# Patient Record
Sex: MALE | Race: BLACK OR AFRICAN AMERICAN | Employment: OTHER | URBAN - METROPOLITAN AREA
[De-identification: names, ages, dates, MRNs, and addresses within clinical notes are randomized per-mention and may not be internally consistent; named-entity substitution may affect disease eponyms.]

---

## 2017-03-03 ENCOUNTER — OFFICE VISIT (OUTPATIENT)
Dept: FAMILY MEDICINE | Facility: CLINIC | Age: 72
End: 2017-03-03
Payer: COMMERCIAL

## 2017-03-03 VITALS
WEIGHT: 240 LBS | BODY MASS INDEX: 36.37 KG/M2 | SYSTOLIC BLOOD PRESSURE: 162 MMHG | DIASTOLIC BLOOD PRESSURE: 112 MMHG | OXYGEN SATURATION: 98 % | RESPIRATION RATE: 14 BRPM | HEIGHT: 68 IN | TEMPERATURE: 98.1 F | HEART RATE: 110 BPM

## 2017-03-03 DIAGNOSIS — I10 BENIGN ESSENTIAL HYPERTENSION: Primary | ICD-10-CM

## 2017-03-03 LAB
BUN SERPL-MCNC: 16 MG/DL (ref 7–30)
CREAT SERPL-MCNC: 1.39 MG/DL (ref 0.66–1.25)
GFR SERPL CREATININE-BSD FRML MDRD: 51 ML/MIN/1.7M2
POTASSIUM SERPL-SCNC: 4.4 MMOL/L (ref 3.4–5.3)

## 2017-03-03 PROCEDURE — 99203 OFFICE O/P NEW LOW 30 MIN: CPT | Performed by: PHYSICIAN ASSISTANT

## 2017-03-03 PROCEDURE — 84132 ASSAY OF SERUM POTASSIUM: CPT | Performed by: PHYSICIAN ASSISTANT

## 2017-03-03 PROCEDURE — 36415 COLL VENOUS BLD VENIPUNCTURE: CPT | Performed by: PHYSICIAN ASSISTANT

## 2017-03-03 PROCEDURE — 82565 ASSAY OF CREATININE: CPT | Performed by: PHYSICIAN ASSISTANT

## 2017-03-03 PROCEDURE — 84520 ASSAY OF UREA NITROGEN: CPT | Performed by: PHYSICIAN ASSISTANT

## 2017-03-03 RX ORDER — LISINOPRIL 10 MG/1
10 TABLET ORAL DAILY
Qty: 30 TABLET | Refills: 1 | Status: SHIPPED | OUTPATIENT
Start: 2017-03-03 | End: 2017-03-31 | Stop reason: DRUGHIGH

## 2017-03-03 RX ORDER — LISINOPRIL 10 MG/1
10 TABLET ORAL DAILY
COMMUNITY
End: 2017-03-03

## 2017-03-03 NOTE — PROGRESS NOTES
SUBJECTIVE:                                                    Bharat Bain is a 72 year old male who presents to clinic today for the following health issues      Hypertension Follow-up      Outpatient blood pressures are being checked at home.      Low Salt Diet: no added salt       Amount of exercise or physical activity: 2-3 days/week for an average of 15-30 minutes---Walks    Problems taking medications regularly: No---off of med X5 days    Medication side effects: none  Diet:Unknown    Pt is from [facility] and visits today with his daughter and son in law. He is a permanent resident here and visits for several months at a time then returns to [facility] for a few months.     He was prescribed lisinopril 10mg daily last year for high blood pressure. He has been taking it regularly until he ran out 5 days ago. He would like to restart the medication. He has not had symptoms of shortness of breath, chest pain, headache, lightheadedness, dizziness, numbness or tingling of the face or extremities.     He has had a complete physical in [facility] within the last year and does not think he is due for that.     Problem list and histories reviewed & adjusted, as indicated.  Additional history: as documented    Patient Active Problem List   Diagnosis     Benign essential hypertension     History reviewed. No pertinent past surgical history.    Social History   Substance Use Topics     Smoking status: Never Smoker     Smokeless tobacco: Not on file     Alcohol use No     History reviewed. No pertinent family history.      Current Outpatient Prescriptions   Medication Sig Dispense Refill     aspirin 81 MG tablet Take 81 mg by mouth daily       lisinopril (PRINIVIL/ZESTRIL) 10 MG tablet Take 1 tablet (10 mg) by mouth daily 30 tablet 1     [DISCONTINUED] lisinopril (ZESTRIL) 10 MG tablet Take 10 mg by mouth daily         Reviewed and updated as needed this visit by clinical staff  Tobacco  Meds  Med Hx  Surg Hx  Fam Hx  Soc Hx  "     Reviewed and updated as needed this visit by Provider         ROS:  Constitutional, HEENT, cardiovascular, pulmonary, gi and gu systems are negative, except as otherwise noted.    OBJECTIVE:                                                    BP (!) 162/112  Pulse 110  Temp 98.1  F (36.7  C) (Tympanic)  Resp 14  Ht 5' 8\" (1.727 m)  Wt 240 lb (108.9 kg)  SpO2 98%  BMI 36.49 kg/m2  Body mass index is 36.49 kg/(m^2).  GENERAL: healthy, alert and no distress  NECK: no adenopathy, no asymmetry, masses, or scars and thyroid normal to palpation  RESP: lungs clear to auscultation - no rales, rhonchi or wheezes  CV: regular rate and rhythm, normal S1 S2, no S3 or S4, no murmur, click or rub, no peripheral edema and peripheral pulses strong  SKIN: no suspicious lesions or rashes  NEURO: Normal strength and tone, mentation intact and speech normal  PSYCH: mentation appears normal, affect normal/bright    Diagnostic Test Results:  Pending below     ASSESSMENT/PLAN:                                                        ICD-10-CM    1. Benign essential hypertension I10 lisinopril (PRINIVIL/ZESTRIL) 10 MG tablet     Creatinine     Urea nitrogen     Potassium     Return in 2 weeks for nurse only visit for blood pressure recheck.   If within acceptable limits, will continue current medication dose (refill x 1 yr)   If not within acceptable limits, will increase to 20mg daily and recheck in another 2 weeks.     The patient and his family agree with this plan.     Nicole Joy Siegler, PA-C  [facility]  "

## 2017-03-03 NOTE — LETTER
[facility]  [address]  [address]  [place] [address]  [phone]                                                                                                           Bharat Bain  [address]   [place] [address]    March 3, 2017      Dear Bharat,    Your recent lab results are below. This shows your kidney function is slightly less than normal. We should continue to monitor this. I recommend recheck in 1-2 months and continuing with your medication as discussed during our visit.     Be sure that you are drinking enough water; there are times that these results are due to dehydration and not problems with your kidney.     Results for orders placed or performed in visit on 03/03/17   Creatinine   Result Value Ref Range    Creatinine 1.39 (H) 0.66 - 1.25 mg/dL    GFR Estimate 51 (L) >60 mL/min/1.7m2    GFR Estimate If Black 61 >60 mL/min/1.7m2   Urea nitrogen   Result Value Ref Range    Urea Nitrogen 16 7 - 30 mg/dL   Potassium   Result Value Ref Range    Potassium 4.4 3.4 - 5.3 mmol/L     Thank you for choosing [facility].  We appreciate the opportunity to serve you and look forward to supporting your healthcare needs in the future.    If you have any questions or concerns, please call me or my staff at [phone].      Sincerely,        Nicole Joy Siegler, PA-C

## 2017-03-03 NOTE — MR AVS SNAPSHOT
After Visit Summary   3/3/2017    Bharat Bain    MRN: [id number]           Patient Information     Date Of Birth          1945        Visit Information        Provider Department      3/3/2017 9:30 AM Siegler, Nicole Joy, PA-C [facility]        Today's Diagnoses     Benign essential hypertension    -  1      Care Instructions      Preventive Health Recommendations:       Male Ages 65 and over    Yearly exam:             See your health care provider every year in order to  o   Review health changes.   o   Discuss preventive care.    o   Review your medicines if your doctor has prescribed any.    Talk with your health care provider about whether you should have a test to screen for prostate cancer (PSA).    Every 3 years, have a diabetes test (fasting glucose). If you are at risk for diabetes, you should have this test more often.    Every 5 years, have a cholesterol test. Have this test more often if you are at risk for high cholesterol or heart disease.     Every 10 years, have a colonoscopy. Or, have a yearly FIT test (stool test). These exams will check for colon cancer.    Talk to with your health care provider about screening for Abdominal Aortic Aneurysm if you have a family history of AAA or have a history of smoking.  Shots:     Get a flu shot each year.     Get a tetanus shot every 10 years.     Talk to your doctor about your pneumonia vaccines. There are now two you should receive - Pneumovax (PPSV 23) and Prevnar (PCV 13).    Talk to your doctor about a shingles vaccine.     Talk to your doctor about the hepatitis B vaccine.  Nutrition:     Eat at least 5 servings of fruits and vegetables each day.     Eat whole-grain bread, whole-wheat pasta and brown rice instead of white grains and rice.     Talk to your doctor about Calcium and Vitamin D.   Lifestyle    Exercise for at least 150 minutes a week (30 minutes a day, 5 days a week). This will help you  "control your weight and prevent disease.     Limit alcohol to one drink per day.     No smoking.     Wear sunscreen to prevent skin cancer.     See your dentist every six months for an exam and cleaning.     See your eye doctor every 1 to 2 years to screen for conditions such as glaucoma, macular degeneration and cataracts.        Follow-ups after your visit        Who to contact     If you have questions or need follow up information about today's clinic visit or your schedule please contact [facility] directly at [phone].  Normal or non-critical lab and imaging results will be communicated to you by Moncaihart, letter or phone within 4 business days after the clinic has received the results. If you do not hear from us within 7 days, please contact the clinic through [place] or phone. If you have a critical or abnormal lab result, we will notify you by phone as soon as possible.  Submit refill requests through STAT-Diagnostica or call your pharmacy and they will forward the refill request to us. Please allow 3 business days for your refill to be completed.          Additional Information About Your Visit        STAT-Diagnostica Information     [employer]-Diagnostica lets you send messages to your doctor, view your test results, renew your prescriptions, schedule appointments and more. To sign up, go to [email or web address] . Click on \"Log in\" on the left side of the screen, which will take you to the Welcome page. Then click on \"Sign up Now\" on the right side of the page.     You will be asked to enter the access code listed below, as well as some personal information. Please follow the directions to create your username and password.     Your access code is: [email or web address]  Expires: 2017  9:56 AM     Your access code will  in 90 days. If you need help or a new code, please call your [facility] or [phone].        Care EveryWhere ID     This is your Care EveryWhere ID. This could be used by other " "organizations to access your [facility] medical records  [id number]        Your Vitals Were     Pulse Temperature Respirations Height Pulse Oximetry BMI (Body Mass Index)    110 98.1  F (36.7  C) (Tympanic) 14 5' 8\" (1.727 m) 98% 36.49 kg/m2       Blood Pressure from Last 3 Encounters:   03/03/17 (!) 162/112    Weight from Last 3 Encounters:   03/03/17 240 lb (108.9 kg)              We Performed the Following     Creatinine     Potassium     Urea nitrogen          Today's Medication Changes          These changes are accurate as of: 3/3/17  9:56 AM.  If you have any questions, ask your nurse or doctor.               These medicines have changed or have updated prescriptions.        Dose/Directions    * ZESTRIL 10 MG tablet   This may have changed:  Another medication with the same name was added. Make sure you understand how and when to take each.   Generic drug:  lisinopril   Changed by:  Siegler, Nicole Joy, PA-C        Dose:  10 mg   Take 10 mg by mouth daily   Refills:  0       * lisinopril 10 MG tablet   Commonly known as:  PRINIVIL/ZESTRIL   This may have changed:  You were already taking a medication with the same name, and this prescription was added. Make sure you understand how and when to take each.   Used for:  Benign essential hypertension   Changed by:  Siegler, Nicole Joy, PA-C        Dose:  10 mg   Take 1 tablet (10 mg) by mouth daily   Quantity:  30 tablet   Refills:  1       * Notice:  This list has 2 medication(s) that are the same as other medications prescribed for you. Read the directions carefully, and ask your doctor or other care provider to review them with you.         Where to get your medicines      These medications were sent to [facility] [place], [place] - [address], [address]  [address], [address], [place] [address]     Phone:  [phone]     lisinopril 10 MG tablet                Primary Care Provider    None Specified       No primary provider on " file.        Thank you!     Thank you for choosing [facility]  for your care. Our goal is always to provide you with excellent care. Hearing back from our patients is one way we can continue to improve our services. Please take a few minutes to complete the written survey that you may receive in the mail after your visit with us. Thank you!             Your Updated Medication List - Protect others around you: Learn how to safely use, store and throw away your medicines at www.disposemymeds.org.          This list is accurate as of: 3/3/17  9:56 AM.  Always use your most recent med list.                   Brand Name Dispense Instructions for use    aspirin 81 MG tablet      Take 81 mg by mouth daily       * ZESTRIL 10 MG tablet   Generic drug:  lisinopril      Take 10 mg by mouth daily       * lisinopril 10 MG tablet    PRINIVIL/ZESTRIL    30 tablet    Take 1 tablet (10 mg) by mouth daily       * Notice:  This list has 2 medication(s) that are the same as other medications prescribed for you. Read the directions carefully, and ask your doctor or other care provider to review them with you.

## 2017-03-17 ENCOUNTER — ALLIED HEALTH/NURSE VISIT (OUTPATIENT)
Dept: NURSING | Facility: CLINIC | Age: 72
End: 2017-03-17
Payer: MEDICAID

## 2017-03-17 ENCOUNTER — TELEPHONE (OUTPATIENT)
Dept: FAMILY MEDICINE | Facility: CLINIC | Age: 72
End: 2017-03-17

## 2017-03-17 VITALS — DIASTOLIC BLOOD PRESSURE: 100 MMHG | SYSTOLIC BLOOD PRESSURE: 150 MMHG

## 2017-03-17 DIAGNOSIS — Z01.30 BP CHECK: Primary | ICD-10-CM

## 2017-03-17 DIAGNOSIS — I10 BENIGN ESSENTIAL HYPERTENSION: Primary | ICD-10-CM

## 2017-03-17 PROCEDURE — 99207 ZZC NO CHARGE NURSE ONLY: CPT

## 2017-03-17 RX ORDER — LISINOPRIL 20 MG/1
20 TABLET ORAL DAILY
Qty: 30 TABLET | Refills: 1 | Status: SHIPPED | OUTPATIENT
Start: 2017-03-17 | End: 2017-03-31 | Stop reason: DRUGHIGH

## 2017-03-17 NOTE — NURSING NOTE
"Chief Complaint   Patient presents with     Hypertension     There were no vitals taken for this visit. Estimated body mass index is 36.49 kg/(m^2) as calculated from the following:    Height as of 3/3/17: 5' 8\" (1.727 m).    Weight as of 3/3/17: 240 lb (108.9 kg).  BP completed using cuff size: adriana Terrazas CMA    Health Maintenance Due   Topic Date Due     TETANUS IMMUNIZATION (SYSTEM ASSIGNED)  02/28/1965     ADVANCE DIRECTIVE PLANNING Q5 YRS (NO INBASKET)  02/28/1965     HEPATITIS C SCREENING  02/28/1965     LIPID SCREEN Q5 YR MALE (SYSTEM ASSIGNED)  02/28/1982     COLON CANCER SCREEN (SYSTEM ASSIGNED)  02/28/1997     PNEUMOCOCCAL (1 of 2 - PCV13) 02/28/2012     AORTIC ANEURYSM SCREENING (SYSTEM ASSIGNED)  02/28/2012     Health Maintenance reviewed at today's visit patient asked to schedule/complete:   None, Health Maintenance up to date.    "

## 2017-03-17 NOTE — TELEPHONE ENCOUNTER
lisinopril (PRINIVIL/ZESTRIL) 10 MG tablet (per office note, increased 20 mg (2 x 10mg tablets)) So patient is requesting a new prescription for 20 mg))   Last Written Prescription Date: 3/3/17  Last Fill Quantity: 30, # refills: 0   [place] Office [place] [facility] or [facility] prescribing provider: 3/3/17       Potassium   Date Value Ref Range Status   03/03/2017 4.4 3.4 - 5.3 mmol/L Final     Creatinine   Date Value Ref Range Status   03/03/2017 1.39 (H) 0.66 - 1.25 mg/dL Final     BP Readings from Last 3 Encounters:   03/17/17 (!) 150/100   03/03/17 (!) 162/112     Routing refill request to provider for review/approval because:  Drug not active on patient's medication list (dosage increase)

## 2017-03-17 NOTE — MR AVS SNAPSHOT
"              After Visit Summary   3/17/2017    Bharat Bain    MRN: [id number]           Patient Information     Date Of Birth          1945        Visit Information        Provider Department      3/17/2017 9:00 AM BX NURSE [facility]        Today's Diagnoses     BP check    -  1       Follow-ups after your visit        Who to contact     If you have questions or need follow up information about today's clinic visit or your schedule please contact [facility] directly at [phone].  Normal or non-critical lab and imaging results will be communicated to you by ModoPaymentshart, letter or phone within 4 business days after the clinic has received the results. If you do not hear from us within 7 days, please contact the clinic through IM-Senset or phone. If you have a critical or abnormal lab result, we will notify you by phone as soon as possible.  Submit refill requests through Yesware or call your pharmacy and they will forward the refill request to us. Please allow 3 business days for your refill to be completed.          Additional Information About Your Visit        MyChart Information     Yesware lets you send messages to your doctor, view your test results, renew your prescriptions, schedule appointments and more. To sign up, go to [email or web address] . Click on \"Log in\" on the left side of the screen, which will take you to the Welcome page. Then click on \"Sign up Now\" on the right side of the page.     You will be asked to enter the access code listed below, as well as some personal information. Please follow the directions to create your username and password.     Your access code is: [email or web address]  Expires: 2017 10:56 AM     Your access code will  in 90 days. If you need help or a new code, please call your [facility] or [phone].        Care EveryWhere ID     This is your Care EveryWhere ID. This could be used by other " organizations to access your [place] medical records  [id number]         Blood Pressure from Last 3 Encounters:   03/17/17 (!) 150/100   03/03/17 (!) 162/112    Weight from Last 3 Encounters:   03/03/17 240 lb (108.9 kg)              Today, you had the following     No orders found for display       Primary Care Provider    None Specified       No primary provider on file.        Thank you!     Thank you for choosing [facility]  for your care. Our goal is always to provide you with excellent care. Hearing back from our patients is one way we can continue to improve our services. Please take a few minutes to complete the written survey that you may receive in the mail after your visit with us. Thank you!             Your Updated Medication List - Protect others around you: Learn how to safely use, store and throw away your medicines at www.disposemymeds.org.          This list is accurate as of: 3/17/17  9:59 AM.  Always use your most recent med list.                   Brand Name Dispense Instructions for use    aspirin 81 MG tablet      Take 81 mg by mouth daily       lisinopril 10 MG tablet    PRINIVIL/ZESTRIL    30 tablet    Take 1 tablet (10 mg) by mouth daily

## 2017-03-31 ENCOUNTER — OFFICE VISIT (OUTPATIENT)
Dept: FAMILY MEDICINE | Facility: CLINIC | Age: 72
End: 2017-03-31
Payer: COMMERCIAL

## 2017-03-31 VITALS
BODY MASS INDEX: 36.07 KG/M2 | OXYGEN SATURATION: 96 % | HEIGHT: 68 IN | WEIGHT: 238 LBS | RESPIRATION RATE: 14 BRPM | SYSTOLIC BLOOD PRESSURE: 152 MMHG | HEART RATE: 99 BPM | TEMPERATURE: 98.3 F | DIASTOLIC BLOOD PRESSURE: 100 MMHG

## 2017-03-31 DIAGNOSIS — I10 BENIGN ESSENTIAL HYPERTENSION: Primary | ICD-10-CM

## 2017-03-31 PROCEDURE — 99213 OFFICE O/P EST LOW 20 MIN: CPT | Performed by: PHYSICIAN ASSISTANT

## 2017-03-31 RX ORDER — LISINOPRIL 40 MG/1
40 TABLET ORAL DAILY
Qty: 30 TABLET | Refills: 0 | Status: SHIPPED | OUTPATIENT
Start: 2017-03-31 | End: 2017-04-14

## 2017-03-31 NOTE — PATIENT INSTRUCTIONS
Start taking lisinopril 40mg  Continue exercise and drinking water as we discussed  Continue your healthy diet  RETURN IN 2 WEEKS FOR BLOOD PRESSURE CHECK AND HAVE YOUR LABS DRAWN

## 2017-03-31 NOTE — NURSING NOTE
"Chief Complaint   Patient presents with     Hypertension     Recheck       Initial BP (!) 144/98 (BP Location: Left arm, Patient Position: Chair, Cuff Size: Adult Large)  Pulse 99  Temp 98.3  F (36.8  C) (Tympanic)  Resp 14  Ht 5' 8\" (1.727 m)  Wt 238 lb (108 kg)  SpO2 96%  BMI 36.19 kg/m2 Estimated body mass index is 36.19 kg/(m^2) as calculated from the following:    Height as of this encounter: 5' 8\" (1.727 m).    Weight as of this encounter: 238 lb (108 kg).  Medication Reconciliation: complete       Geri Roman LPN  "

## 2017-03-31 NOTE — MR AVS SNAPSHOT
"              After Visit Summary   3/31/2017    Bharat Bain    MRN: [id number]           Patient Information     Date Of Birth          1945        Visit Information        Provider Department      3/31/2017 9:30 AM Siegler, Nicole Joy, PA-C [facility]        Today's Diagnoses     Benign essential hypertension    -  1      Care Instructions    Start taking lisinopril 40mg  Continue exercise and drinking water as we discussed  Continue your healthy diet  RETURN IN 2 WEEKS FOR BLOOD PRESSURE CHECK AND HAVE YOUR LABS DRAWN        Follow-ups after your visit        Future tests that were ordered for you today     Open Future Orders        Priority Expected Expires Ordered    Creatinine Routine 4/1/2017 3/31/2018 3/31/2017            Who to contact     If you have questions or need follow up information about today's clinic visit or your schedule please contact [facility] directly at [phone].  Normal or non-critical lab and imaging results will be communicated to you by MyChart, letter or phone within 4 business days after the clinic has received the results. If you do not hear from us within 7 days, please contact the clinic through UrbanBoundhart or phone. If you have a critical or abnormal lab result, we will notify you by phone as soon as possible.  Submit refill requests through OuiCar or call your pharmacy and they will forward the refill request to us. Please allow 3 business days for your refill to be completed.          Additional Information About Your Visit        MyChart Information     OuiCar lets you send messages to your doctor, view your test results, renew your prescriptions, schedule appointments and more. To sign up, go to [email or web address] . Click on \"Log in\" on the left side of the screen, which will take you to the Welcome page. Then click on \"Sign up Now\" on the right side of the page.     You will be asked to enter the " "access code listed below, as well as some personal information. Please follow the directions to create your username and password.     Your access code is: [email or web address]  Expires: 2017 10:56 AM     Your access code will  in 90 days. If you need help or a new code, please call your [facility] clinic or [phone].        Care EveryWhere ID     This is your Care EveryWhere ID. This could be used by other organizations to access your [facility] medical records  [id number]        Your Vitals Were     Pulse Temperature Respirations Height Pulse Oximetry BMI (Body Mass Index)    99 98.3  F (36.8  C) (Tympanic) 14 5' 8\" (1.727 m) 96% 36.19 kg/m2       Blood Pressure from Last 3 Encounters:   17 (!) 152/100   17 (!) 150/100   17 (!) 162/112    Weight from Last 3 Encounters:   17 238 lb (108 kg)   17 240 lb (108.9 kg)                 Today's Medication Changes          These changes are accurate as of: 3/31/17  9:56 AM.  If you have any questions, ask your nurse or doctor.               These medicines have changed or have updated prescriptions.        Dose/Directions    lisinopril 40 MG tablet   Commonly known as:  PRINIVIL/ZESTRIL   This may have changed:    - medication strength  - how much to take  - Another medication with the same name was removed. Continue taking this medication, and follow the directions you see here.   Used for:  Benign essential hypertension   Changed by:  Siegler, Nicole Joy, PA-C        Dose:  40 mg   Take 1 tablet (40 mg) by mouth daily   Quantity:  30 tablet   Refills:  0            Where to get your medicines      These medications were sent to [facility], [place] - [address], [address]  [address], [facility] [facility] [address], [place] [facility] [address]     Phone:  [phone]     lisinopril 40 MG tablet                Primary Care Provider    None Specified       No primary provider on file.        Thank you!     Thank you for choosing " [facility]  for your care. Our goal is always to provide you with excellent care. Hearing back from our patients is one way we can continue to improve our services. Please take a few minutes to complete the written survey that you may receive in the mail after your visit with us. Thank you!             Your Updated Medication List - Protect others around you: Learn how to safely use, store and throw away your medicines at www.disposemymeds.org.          This list is accurate as of: 3/31/17  9:56 AM.  Always use your most recent med list.                   Brand Name Dispense Instructions for use    aspirin 81 MG tablet      Take 81 mg by mouth daily       lisinopril 40 MG tablet    PRINIVIL/ZESTRIL    30 tablet    Take 1 tablet (40 mg) by mouth daily

## 2017-03-31 NOTE — PROGRESS NOTES
SUBJECTIVE:                                                    Bharat Bain is a 72 year old male who presents to clinic today for the following health issues:    Hypertension Follow-up      Outpatient blood pressures are being checked at home.  Results are elevated.    Low Salt Diet: no added salt       Amount of exercise or physical activity: 2-3 days/week for an average of 15-30 minutes    Problems taking medications regularly: No    Medication side effects: none    Diet: low salt    Pt has been doing well with his diet, he has been walking for exercise though not as much as he feels he can. He is taking his medications as directed.  He is getting a little nervous about his blood pressure continuing to be high. He has been otherwise well- see ROS    Problem list and histories reviewed & adjusted, as indicated.  Additional history: as documented    Patient Active Problem List   Diagnosis     Benign essential hypertension     History reviewed. No pertinent surgical history.    Social History   Substance Use Topics     Smoking status: Never Smoker     Smokeless tobacco: Not on file     Alcohol use No     Family History   Problem Relation Age of Onset     Unknown/Adopted Mother      Unknown/Adopted Father      DIABETES No family hx of      Coronary Artery Disease No family hx of      Hypertension No family hx of      Hyperlipidemia No family hx of      CEREBROVASCULAR DISEASE No family hx of      Breast Cancer No family hx of      Colon Cancer No family hx of      Prostate Cancer No family hx of      Other Cancer No family hx of      Depression No family hx of      Anxiety Disorder No family hx of      MENTAL ILLNESS No family hx of      Substance Abuse No family hx of      Anesthesia Reaction No family hx of      Asthma No family hx of      OSTEOPOROSIS No family hx of      Genetic Disorder No family hx of      Thyroid Disease No family hx of      Obesity No family hx of          Current Outpatient Prescriptions  "  Medication Sig Dispense Refill     lisinopril (PRINIVIL/ZESTRIL) 40 MG tablet Take 1 tablet (40 mg) by mouth daily 30 tablet 0     [DISCONTINUED] lisinopril (PRINIVIL/ZESTRIL) 20 MG tablet Take 1 tablet (20 mg) by mouth daily 30 tablet 1     aspirin 81 MG tablet Take 81 mg by mouth daily       [DISCONTINUED] lisinopril (PRINIVIL/ZESTRIL) 10 MG tablet Take 1 tablet (10 mg) by mouth daily 30 tablet 1       Reviewed and updated as needed this visit by clinical staff  Tobacco  Meds  Med Hx  Surg Hx  Fam Hx  Soc Hx      Reviewed and updated as needed this visit by Provider         ROS:  Constitutional, HEENT, cardiovascular, pulmonary, gi and gu systems are negative, except as otherwise noted.    OBJECTIVE:                                                    BP (!) 152/100  Pulse 99  Temp 98.3  F (36.8  C) (Tympanic)  Resp 14  Ht 5' 8\" (1.727 m)  Wt 238 lb (108 kg)  SpO2 96%  BMI 36.19 kg/m2  Body mass index is 36.19 kg/(m^2).  GENERAL: healthy, alert and no distress  NECK: no adenopathy, no asymmetry, masses, or scars and thyroid normal to palpation  RESP: lungs clear to auscultation - no rales, rhonchi or wheezes  CV: regular rate and rhythm, normal S1 S2, no S3 or S4, no murmur, click or rub, no peripheral edema and peripheral pulses strong    Diagnostic Test Results:  none      ASSESSMENT/PLAN:                                                        ICD-10-CM    1. Benign essential hypertension I10 lisinopril (PRINIVIL/ZESTRIL) 40 MG tablet     Creatinine     Discussed instructions below; he agrees with that plan.     Patient Instructions   Start taking lisinopril 40mg  Continue exercise and drinking water as we discussed  Continue your healthy diet  RETURN IN 2 WEEKS FOR BLOOD PRESSURE CHECK AND HAVE YOUR LABS DRAWN      Nicole Joy Siegler, PA-C  [facility]  "

## 2017-04-14 ENCOUNTER — TELEPHONE (OUTPATIENT)
Dept: FAMILY MEDICINE | Facility: CLINIC | Age: 72
End: 2017-04-14

## 2017-04-14 ENCOUNTER — ALLIED HEALTH/NURSE VISIT (OUTPATIENT)
Dept: NURSING | Facility: CLINIC | Age: 72
End: 2017-04-14
Payer: COMMERCIAL

## 2017-04-14 VITALS
DIASTOLIC BLOOD PRESSURE: 98 MMHG | WEIGHT: 231 LBS | SYSTOLIC BLOOD PRESSURE: 142 MMHG | BODY MASS INDEX: 35.12 KG/M2 | HEART RATE: 100 BPM

## 2017-04-14 DIAGNOSIS — I10 BENIGN ESSENTIAL HYPERTENSION: ICD-10-CM

## 2017-04-14 DIAGNOSIS — I10 BENIGN ESSENTIAL HYPERTENSION: Primary | ICD-10-CM

## 2017-04-14 LAB
CREAT SERPL-MCNC: 1.62 MG/DL (ref 0.66–1.25)
GFR SERPL CREATININE-BSD FRML MDRD: 42 ML/MIN/1.7M2

## 2017-04-14 PROCEDURE — 36415 COLL VENOUS BLD VENIPUNCTURE: CPT | Performed by: PHYSICIAN ASSISTANT

## 2017-04-14 PROCEDURE — 99207 ZZC NO CHARGE LOS: CPT

## 2017-04-14 PROCEDURE — 82565 ASSAY OF CREATININE: CPT | Performed by: PHYSICIAN ASSISTANT

## 2017-04-14 RX ORDER — LISINOPRIL 40 MG/1
20 TABLET ORAL DAILY
Qty: 30 TABLET | Refills: 0
Start: 2017-04-14 | End: 2017-05-12

## 2017-04-14 RX ORDER — AMLODIPINE BESYLATE 5 MG/1
5 TABLET ORAL DAILY
Qty: 30 TABLET | Refills: 1 | Status: SHIPPED | OUTPATIENT
Start: 2017-04-14 | End: 2017-05-12 | Stop reason: DRUGHIGH

## 2017-04-14 NOTE — TELEPHONE ENCOUNTER
I spoke with the pt's son in law and explained to him and the patient (on speaker phone) the plan is this:   1) reduce lisinopril to 20mg (1/2 tab)   2) start amlodipine 5mg daily  3) recheck blood pressure with nursing visit in 2 weeks  4) we will recheck kidney function and blood pressure again at 4 weeks and adjust from there.   They were in agreement with this plan.   Nicole Joy Siegler, PA-C

## 2017-04-14 NOTE — TELEPHONE ENCOUNTER
His blood pressure is improving well. He should have one more check of blood pressure in 2 weeks. It does take up to 4 weeks for the full effect of the daily dose of medication to be seen. I anticipate the blood pressure will be within normal limits by that time. Please ask him to return in 2 weeks for blood pressure (nurse only visit). He is doing very well! Thank you- Nicole Joy Siegler, PA-C

## 2017-05-12 ENCOUNTER — ALLIED HEALTH/NURSE VISIT (OUTPATIENT)
Dept: NURSING | Facility: CLINIC | Age: 72
End: 2017-05-12
Payer: COMMERCIAL

## 2017-05-12 VITALS — SYSTOLIC BLOOD PRESSURE: 152 MMHG | OXYGEN SATURATION: 99 % | DIASTOLIC BLOOD PRESSURE: 98 MMHG | HEART RATE: 106 BPM

## 2017-05-12 DIAGNOSIS — I10 BENIGN ESSENTIAL HYPERTENSION: Primary | ICD-10-CM

## 2017-05-12 PROCEDURE — 99207 ZZC NO CHARGE LOS: CPT

## 2017-05-12 RX ORDER — LISINOPRIL 40 MG/1
20 TABLET ORAL DAILY
Qty: 30 TABLET | Refills: 1 | Status: SHIPPED | OUTPATIENT
Start: 2017-05-12 | End: 2017-08-22

## 2017-05-12 RX ORDER — AMLODIPINE BESYLATE 10 MG/1
10 TABLET ORAL DAILY
Qty: 30 TABLET | Refills: 1 | Status: SHIPPED | OUTPATIENT
Start: 2017-05-12 | End: 2017-06-26

## 2017-05-12 NOTE — NURSING NOTE
"Chief Complaint   Patient presents with     Hypertension     Nurse only visit to recheck       Initial BP (!) 152/98  Pulse 106  SpO2 99% Estimated body mass index is 35.12 kg/(m^2) as calculated from the following:    Height as of 3/31/17: 5' 8\" (1.727 m).    Weight as of 4/14/17: 231 lb (104.8 kg).  Medication Reconciliation: complete     Geri Roman LPN    B/P was checked X2 using adult regular and adult large cuff. B/P results elevated X2. Primary provider was notifed. Amlodipine was increased to 10mg po QD. Patient and family were advised of change in medication. They were directed to schedule a follow up appointment with provider in two weeks.  "

## 2017-05-12 NOTE — PROGRESS NOTES
Discussed blood pressure results with MA and she will notify patient of increase in amlodipine to 10mg daily. Recheck in 2 weeks. Nicole Joy Siegler, PA-C

## 2017-05-12 NOTE — MR AVS SNAPSHOT
"              After Visit Summary   2017    Bharat Bain    MRN: [id number]           Patient Information     Date Of Birth          1945        Visit Information        Provider Department      2017 10:30 AM BX NURSE [facility]        Today's Diagnoses     Benign essential hypertension    -  1       Follow-ups after your visit        Who to contact     If you have questions or need follow up information about today's clinic visit or your schedule please contact [facility] directly at [phone].  Normal or non-critical lab and imaging results will be communicated to you by RadMithart, letter or phone within 4 business days after the clinic has received the results. If you do not hear from us within 7 days, please contact the clinic through [employer] or phone. If you have a critical or abnormal lab result, we will notify you by phone as soon as possible.  Submit refill requests through [employer] or call your pharmacy and they will forward the refill request to us. Please allow 3 business days for your refill to be completed.          Additional Information About Your Visit        MyChart Information     Action[employer]anner lets you send messages to your doctor, view your test results, renew your prescriptions, schedule appointments and more. To sign up, go to [email or web address] . Click on \"Log in\" on the left side of the screen, which will take you to the Welcome page. Then click on \"Sign up Now\" on the right side of the page.     You will be asked to enter the access code listed below, as well as some personal information. Please follow the directions to create your username and password.     Your access code is: [email or web address]  Expires: 2017 10:56 AM     Your access code will  in 90 days. If you need help or a new code, please call your [facility] or [phone].        Care EveryWhere ID     This is your Care EveryWhere ID. This could " be used by other organizations to access your [facility] medical records  [id number]        Your Vitals Were     Pulse Pulse Oximetry                106 99%           Blood Pressure from Last 3 Encounters:   05/12/17 (!) 152/98   04/14/17 (!) 142/98   03/31/17 (!) 152/100    Weight from Last 3 Encounters:   04/14/17 231 lb (104.8 kg)   03/31/17 238 lb (108 kg)   03/03/17 240 lb (108.9 kg)              Today, you had the following     No orders found for display         Today's Medication Changes          These changes are accurate as of: 5/12/17 10:59 AM.  If you have any questions, ask your nurse or doctor.               These medicines have changed or have updated prescriptions.        Dose/Directions    amLODIPine 10 MG tablet   Commonly known as:  NORVASC   This may have changed:    - medication strength  - how much to take   Used for:  Benign essential hypertension        Dose:  10 mg   Take 1 tablet (10 mg) by mouth daily   Quantity:  30 tablet   Refills:  1            Where to get your medicines      These medications were sent to [facility] [place] - [address]  [address], [facility] [address]     Phone:  [phone]     amLODIPine 10 MG tablet    lisinopril 40 MG tablet                Primary Care Provider Office Phone # Fax #    Nicole Joy Siegler, PA-C [phone]       [facility] [address]  [facility] [address]        Thank you!     Thank you for choosing [facility]  for your care. Our goal is always to provide you with excellent care. Hearing back from our patients is one way we can continue to improve our services. Please take a few minutes to complete the written survey that you may receive in the mail after your visit with us. Thank you!             Your Updated Medication List - Protect others around you: Learn how to safely use, store and throw away your medicines at  www.disposemymeds.org.          This list is accurate as of: 5/12/17 10:59 AM.  Always use your most recent med list.                   Brand Name Dispense Instructions for use    amLODIPine 10 MG tablet    NORVASC    30 tablet    Take 1 tablet (10 mg) by mouth daily       aspirin 81 MG tablet      Take 81 mg by mouth daily       lisinopril 40 MG tablet    PRINIVIL/ZESTRIL    30 tablet    Take 0.5 tablets (20 mg) by mouth daily (reduced due to raising creatinine)

## 2017-05-25 ENCOUNTER — OFFICE VISIT (OUTPATIENT)
Dept: FAMILY MEDICINE | Facility: CLINIC | Age: 72
End: 2017-05-25
Payer: COMMERCIAL

## 2017-05-25 VITALS
RESPIRATION RATE: 14 BRPM | DIASTOLIC BLOOD PRESSURE: 86 MMHG | HEIGHT: 68 IN | HEART RATE: 111 BPM | SYSTOLIC BLOOD PRESSURE: 138 MMHG | WEIGHT: 230.1 LBS | TEMPERATURE: 99 F | OXYGEN SATURATION: 100 % | BODY MASS INDEX: 34.87 KG/M2

## 2017-05-25 DIAGNOSIS — I10 BENIGN ESSENTIAL HYPERTENSION: Primary | ICD-10-CM

## 2017-05-25 DIAGNOSIS — N18.30 CKD (CHRONIC KIDNEY DISEASE) STAGE 3, GFR 30-59 ML/MIN (H): ICD-10-CM

## 2017-05-25 PROCEDURE — 99213 OFFICE O/P EST LOW 20 MIN: CPT | Performed by: PHYSICIAN ASSISTANT

## 2017-05-25 NOTE — MR AVS SNAPSHOT
"              After Visit Summary   5/25/2017    Bharat Bain    MRN: [id number]           Patient Information     Date Of Birth          1945        Visit Information        Provider Department      5/25/2017 3:30 PM Siegler, Nicole Joy, PA-C [facility]        Today's Diagnoses     Benign essential hypertension    -  1    CKD (chronic kidney disease) stage 3, GFR 30-59 ml/min           Follow-ups after your visit        Future tests that were ordered for you today     Open Future Orders        Priority Expected Expires Ordered    Creatinine Routine  5/25/2018 5/25/2017            Who to contact     If you have questions or need follow up information about today's clinic visit or your schedule please contact [facility] directly at [phone].  Normal or non-critical lab and imaging results will be communicated to you by MyChart, letter or phone within 4 business days after the clinic has received the results. If you do not hear from us within 7 days, please contact the clinic through MyChart or phone. If you have a critical or abnormal lab result, we will notify you by phone as soon as possible.  Submit refill requests through [employer] or call your pharmacy and they will forward the refill request to us. Please allow 3 business days for your refill to be completed.          Additional Information About Your Visit        MyChart Information     [employer] lets you send messages to your doctor, view your test results, renew your prescriptions, schedule appointments and more. To sign up, go to [email or web address] . Click on \"Log in\" on the left side of the screen, which will take you to the Welcome page. Then click on \"Sign up Now\" on the right side of the page.     You will be asked to enter the access code listed below, as well as some personal information. Please follow the directions to create your username and password.     Your access code is: " "[email or web address]  Expires: 2017 10:56 AM     Your access code will  in 90 days. If you need help or a new code, please call your [facility] clinic or [phone].        Care EveryWhere ID     This is your Care EveryWhere ID. This could be used by other organizations to access your [facility] medical records  [id number]        Your Vitals Were     Pulse Temperature Respirations Height Pulse Oximetry BMI (Body Mass Index)    111 99  F (37.2  C) (Tympanic) 14 5' 8\" (1.727 m) 100% 34.99 kg/m2       Blood Pressure from Last 3 Encounters:   17 138/86   17 (!) 152/98   17 (!) 142/98    Weight from Last 3 Encounters:   17 230 lb 1.6 oz (104.4 kg)   17 231 lb (104.8 kg)   17 238 lb (108 kg)               Primary Care Provider Office Phone # Fax #    Nicole Joy Siegler, PA-C [phone]       [facility] [address]  [facility] [address]        Thank you!     Thank you for choosing [facility]  for your care. Our goal is always to provide you with excellent care. Hearing back from our patients is one way we can continue to improve our services. Please take a few minutes to complete the written survey that you may receive in the mail after your visit with us. Thank you!             Your Updated Medication List - Protect others around you: Learn how to safely use, store and throw away your medicines at www.disposemymeds.org.          This list is accurate as of: 17  4:37 PM.  Always use your most recent med list.                   Brand Name Dispense Instructions for use    amLODIPine 10 MG tablet    NORVASC    30 tablet    Take 1 tablet (10 mg) by mouth daily       aspirin 81 MG tablet      Take 81 mg by mouth daily       lisinopril 40 MG tablet    PRINIVIL/ZESTRIL    30 tablet    Take 0.5 tablets (20 mg) by mouth daily (reduced due to raising creatinine)         "

## 2017-05-25 NOTE — PROGRESS NOTES
SUBJECTIVE:                                                    Bharat Bain is a 72 year old male who presents to clinic today for the following health issues:    Hypertension Follow-up      Outpatient blood pressures are being checked at home.  Results are Varies, family has been checking with good reading.    Low Salt Diet: no added salt       Amount of exercise or physical activity: 4-5 days/week for an average of 15-30 minutes    Problems taking medications regularly: No    Medication side effects: none    Diet: low salt    Patient presents with his son in law who assists with interpretation at times.     Blood pressure has been well controlled at home with his current regimen for the past few weeks.   Regimen includes lisinopril 20mg and amlodipine 10mg.   He does not have negative side effects to the medicine, specifically denies chest pain, shortness of breath, lightheadedness, dizziness, peripheral edema.     Problem list and histories reviewed & adjusted, as indicated.  Additional history: as documented    Patient Active Problem List   Diagnosis     Benign essential hypertension     CKD (chronic kidney disease) stage 3, GFR 30-59 ml/min     History reviewed. No pertinent surgical history.    Social History   Substance Use Topics     Smoking status: Never Smoker     Smokeless tobacco: Not on file     Alcohol use No     Family History   Problem Relation Age of Onset     Unknown/Adopted Mother      Unknown/Adopted Father      DIABETES No family hx of      Coronary Artery Disease No family hx of      Hypertension No family hx of      Hyperlipidemia No family hx of      CEREBROVASCULAR DISEASE No family hx of      Breast Cancer No family hx of      Colon Cancer No family hx of      Prostate Cancer No family hx of      Other Cancer No family hx of      Depression No family hx of      Anxiety Disorder No family hx of      MENTAL ILLNESS No family hx of      Substance Abuse No family hx of      Anesthesia  "Reaction No family hx of      Asthma No family hx of      OSTEOPOROSIS No family hx of      Genetic Disorder No family hx of      Thyroid Disease No family hx of      Obesity No family hx of          Current Outpatient Prescriptions   Medication Sig Dispense Refill     amLODIPine (NORVASC) 10 MG tablet Take 1 tablet (10 mg) by mouth daily 30 tablet 1     lisinopril (PRINIVIL/ZESTRIL) 40 MG tablet Take 0.5 tablets (20 mg) by mouth daily (reduced due to raising creatinine) 30 tablet 1     aspirin 81 MG tablet Take 81 mg by mouth daily         Reviewed and updated as needed this visit by clinical staff  Tobacco  Allergies  Meds  Med Hx  Surg Hx  Fam Hx  Soc Hx      Reviewed and updated as needed this visit by Provider         ROS:  As above    OBJECTIVE:                                                    /86 (BP Location: Right arm, Patient Position: Chair, Cuff Size: Adult Large)  Pulse 111  Temp 99  F (37.2  C) (Tympanic)  Resp 14  Ht 5' 8\" (1.727 m)  Wt 230 lb 1.6 oz (104.4 kg)  SpO2 100%  BMI 34.99 kg/m2  Body mass index is 34.99 kg/(m^2).  GENERAL: healthy, alert and no distress    Diagnostic Test Results:  none      ASSESSMENT/PLAN:                                                        ICD-10-CM    1. Benign essential hypertension I10    2. CKD (chronic kidney disease) stage 3, GFR 30-59 ml/min N18.3 Creatinine     Patient is comfortable continuing his current plan and medication regimen (lisinopril 20mg and amlodipine 10mg daily).     Patient is planning to fast for ramadan and questions whether or not he should do that. I recommended that should he choose to take part in the fast, he should be drinking water during the day (even though that is generally not allowed) to reduce the potential for worsening kidney disease. He should have no real trouble in the short term with not eating during the daytime.       I have recommended he return in 2 weeks to recheck his kidney function, if starting " the fast. IF that is worsening, he will be recommended to stop the fast and hydrate even more.     Return for recheck of labs and blood pressure/ office visit in July.     15 total minutes spent with the patient, > 50% face to face discussing the patients concerns and addressing questions in the above assessment and plan.         Nicole Joy Siegler, PA-C  [facility]

## 2017-05-25 NOTE — NURSING NOTE
"Chief Complaint   Patient presents with     Hypertension     Recheck        Initial /86 (BP Location: Right arm, Patient Position: Chair, Cuff Size: Adult Large)  Pulse 111  Temp 99  F (37.2  C) (Tympanic)  Resp 14  Ht 5' 8\" (1.727 m)  Wt 230 lb 1.6 oz (104.4 kg)  SpO2 100%  BMI 34.99 kg/m2 Estimated body mass index is 34.99 kg/(m^2) as calculated from the following:    Height as of this encounter: 5' 8\" (1.727 m).    Weight as of this encounter: 230 lb 1.6 oz (104.4 kg).  Medication Reconciliation: complete     Geri Roman LPN  "

## 2017-06-26 DIAGNOSIS — I10 BENIGN ESSENTIAL HYPERTENSION: ICD-10-CM

## 2017-06-27 RX ORDER — AMLODIPINE BESYLATE 10 MG/1
TABLET ORAL
Qty: 30 TABLET | Refills: 10 | Status: SHIPPED | OUTPATIENT
Start: 2017-06-27 | End: 2017-08-22

## 2017-06-27 NOTE — TELEPHONE ENCOUNTER
amLODIPine (NORVASC) 10 MG tablet      Last Written Prescription Date: 5/12/2017  Last Fill Quantity: 30, # refills: 1    Last Office Visit with [facility], [facility] or [facility] prescribing provider:  5/25/2017   Future Office Visit:        BP Readings from Last 3 Encounters:   05/25/17 138/86   05/12/17 (!) 152/98   04/14/17 (!) 142/98

## 2017-07-06 ENCOUNTER — TELEPHONE (OUTPATIENT)
Dept: FAMILY MEDICINE | Facility: CLINIC | Age: 72
End: 2017-07-06

## 2017-07-06 DIAGNOSIS — Z12.11 SCREENING FOR MALIGNANT NEOPLASM OF COLON: Primary | ICD-10-CM

## 2017-07-06 NOTE — LETTER
[facility]  [address]  [facility] [address] [phone]                                                                                                           Bharat Bain  [address]  [place] [address]    July 6, 2017          Dear Bharat Bain,      We care about your well-being!  In order to ensure we are providing the best quality care, we have reviewed your chart and see that you are due for:     _____ Physical   _____ Pap Smear   _____ Mammogram   _____ Diabetes Followup   _____ Hypertension Followup   _____ Cholesterol Followup (Provider Appointment)   __x___ Cholesterol Test (Lab-Only Appointment)   __x___ Other:  Colonoscopy     We can assist you in scheduling these appointments at [phone].    If you have had (or plan to have) any of these tests done at a facility other than [facility] or a [facility], please have the results from these tests sent to your primary physician at [facility].             Sincerely,    Nicole Siegler, PA

## 2017-08-10 ENCOUNTER — DOCUMENTATION ONLY (OUTPATIENT)
Dept: LAB | Facility: CLINIC | Age: 72
End: 2017-08-10

## 2017-08-10 DIAGNOSIS — Z13.6 SCREENING FOR CARDIOVASCULAR CONDITION: ICD-10-CM

## 2017-08-10 DIAGNOSIS — Z11.59 NEED FOR HEPATITIS C SCREENING TEST: Primary | ICD-10-CM

## 2017-08-10 NOTE — PROGRESS NOTES
Patient is coming in on 8/11 for a Creatinine lab draw. She also has outstanding Health Maintenance labs. I have pended those tests. Please sign, if appropriate.    Thanks!  Mirella Doherty

## 2017-08-11 DIAGNOSIS — Z13.6 SCREENING FOR CARDIOVASCULAR CONDITION: ICD-10-CM

## 2017-08-11 DIAGNOSIS — N18.30 CKD (CHRONIC KIDNEY DISEASE) STAGE 3, GFR 30-59 ML/MIN (H): ICD-10-CM

## 2017-08-11 DIAGNOSIS — Z11.59 NEED FOR HEPATITIS C SCREENING TEST: ICD-10-CM

## 2017-08-11 LAB
CHOLEST SERPL-MCNC: 191 MG/DL
CREAT SERPL-MCNC: 1.38 MG/DL (ref 0.66–1.25)
GFR SERPL CREATININE-BSD FRML MDRD: 51 ML/MIN/1.7M2
HCV AB SERPL QL IA: NORMAL
HDLC SERPL-MCNC: 58 MG/DL
LDLC SERPL CALC-MCNC: 110 MG/DL
NONHDLC SERPL-MCNC: 133 MG/DL
TRIGL SERPL-MCNC: 117 MG/DL

## 2017-08-11 PROCEDURE — 82565 ASSAY OF CREATININE: CPT | Performed by: PHYSICIAN ASSISTANT

## 2017-08-11 PROCEDURE — 86803 HEPATITIS C AB TEST: CPT | Performed by: PHYSICIAN ASSISTANT

## 2017-08-11 PROCEDURE — 80061 LIPID PANEL: CPT | Performed by: PHYSICIAN ASSISTANT

## 2017-08-11 PROCEDURE — 36415 COLL VENOUS BLD VENIPUNCTURE: CPT | Performed by: PHYSICIAN ASSISTANT

## 2017-08-11 NOTE — LETTER
Bharat Bain  [address]  [place] [address]        August 14, 2017          Dear ,    We are writing to inform you of your test results.    All of your lab results are normal, except as noted below.     The following are explanations of some of our lab tests     THIS DOES NOT MEAN THAT YOU HAD ALL OF THESE DONE     Please continue on the same medications unless a change is noted above     These are some general explanations for tests:     Hgb is the blood iron level   WBC means White Blood Cells   Platelets are small blood cells that help with forming the blood clots along with other blood factors.   Electrolytes are Sodium, Potassium, Calcium, Magnesium, Phosphorus.   Liver tests are: AST, ALT, Bilirubin, Alkaline Phosphatase.   Kidney tests are Creatinine, GFR.###show the normal aging   HDL Cholesterol - is the good cholesterol and it is good to have it high.   LDL cholesterol is the bad cholesterol and it is good to have it low. ##just a little high   It is recommended to have LDL less than 130 for people with hypertension and to have it less than 100 for people with heart disease, diabetes and chronic kidney disease.   Triglycerides are another type of lipid and can also cause heart disease so should be kept low.   Thyroid tests are TSH, T4, T3   Glucose is sugar   A1c is a test that gives us an idea about how well was controlled the diabetes for the last 3 months.   PSA stands for Prostate Specific Antigen and it can be elevated with prostate cancer or prostate inflammation.     Resulted Orders   Creatinine   Result Value Ref Range    Creatinine 1.38 (H) 0.66 - 1.25 mg/dL    GFR Estimate 51 (L) >60 mL/min/1.7m2      Comment:      Non  GFR Calc    GFR Estimate If Black 62 >60 mL/min/1.7m2      Comment:      African American GFR Calc   Hepatitis C Screen Reflex to HCV RNA Quant and Genotype   Result Value Ref Range    Hepatitis C Antibody  NR     Nonreactive   Assay  performance characteristics have not been established for newborns,   infants, and children     Lipid panel reflex to direct LDL   Result Value Ref Range    Cholesterol 191 <200 mg/dL    Triglycerides 117 <150 mg/dL      Comment:      Fasting specimen    HDL Cholesterol 58 >39 mg/dL    LDL Cholesterol Calculated 110 (H) <100 mg/dL      Comment:      Above desirable:  100-129 mg/dl   Borderline High:  130-159 mg/dL   High:             160-189 mg/dL   Very high:       >189 mg/dl      Non HDL Cholesterol 133 (H) <130 mg/dL      Comment:      Above Desirable:  130-159 mg/dl   Borderline high:  160-189 mg/dl   High:             190-219 mg/dl   Very high:       >219 mg/dl         If you have any questions or concerns, please call the clinic at the number listed above.       Sincerely,        [facility]

## 2017-08-12 NOTE — PROGRESS NOTES
Please see attached lab results  All of your lab results are normal, except as noted below.    The following are explanations of some of our lab tests    THIS DOES NOT MEAN THAT YOU HAD ALL OF THESE DONE    Please continue on the same medications unless a change is noted above    These are some general explanations for tests:    Hgb is the blood iron level  WBC means White Blood Cells  Platelets are small blood cells that help with forming the blood clots along with other blood factors.  Electrolytes are Sodium, Potassium, Calcium, Magnesium, Phosphorus.  Liver tests are: AST, ALT, Bilirubin, Alkaline Phosphatase.  Kidney tests are Creatinine, GFR.###show the normal aging   HDL Cholesterol - is the good cholesterol and it is good to have it high.  LDL cholesterol is the bad cholesterol and it is good to have it low. ##just a little high  It is recommended to have LDL less than 130 for people with hypertension and to have it less than 100 for people with heart disease, diabetes and chronic kidney disease.  Triglycerides are another type of lipid and can also cause heart disease so should be kept low.   Thyroid tests are TSH, T4, T3  Glucose is sugar  A1c is a test that gives us an idea about how well was controlled the diabetes for the last 3 months.   PSA stands for Prostate Specific Antigen and it can be elevated with prostate cancer or prostate inflammation.

## 2017-08-22 ENCOUNTER — OFFICE VISIT (OUTPATIENT)
Dept: FAMILY MEDICINE | Facility: CLINIC | Age: 72
End: 2017-08-22
Payer: COMMERCIAL

## 2017-08-22 VITALS
SYSTOLIC BLOOD PRESSURE: 138 MMHG | WEIGHT: 229 LBS | HEART RATE: 101 BPM | RESPIRATION RATE: 16 BRPM | DIASTOLIC BLOOD PRESSURE: 88 MMHG | BODY MASS INDEX: 34.71 KG/M2 | HEIGHT: 68 IN | OXYGEN SATURATION: 100 % | TEMPERATURE: 98.6 F

## 2017-08-22 DIAGNOSIS — N18.30 CKD (CHRONIC KIDNEY DISEASE) STAGE 3, GFR 30-59 ML/MIN (H): ICD-10-CM

## 2017-08-22 DIAGNOSIS — I10 BENIGN ESSENTIAL HYPERTENSION: Primary | ICD-10-CM

## 2017-08-22 PROCEDURE — 99213 OFFICE O/P EST LOW 20 MIN: CPT | Performed by: PHYSICIAN ASSISTANT

## 2017-08-22 RX ORDER — AMLODIPINE BESYLATE 10 MG/1
10 TABLET ORAL DAILY
Qty: 90 TABLET | Refills: 4 | Status: SHIPPED | OUTPATIENT
Start: 2017-08-22 | End: 2018-01-03

## 2017-08-22 RX ORDER — LISINOPRIL 20 MG/1
20 TABLET ORAL DAILY
Qty: 90 TABLET | Refills: 4 | Status: SHIPPED | OUTPATIENT
Start: 2017-08-22 | End: 2018-03-14

## 2017-08-22 NOTE — PROGRESS NOTES
SUBJECTIVE:   Bharat Bain is a 72 year old male who presents to clinic today for the following health issues:    Hypertension Follow-up      Outpatient blood pressures are being checked at home.  Results are reported normal range. Forget B/P list at home,.    Low Salt Diet: no added salt        Amount of exercise or physical activity: 6-7 days/week for an average of 30-45 minutes    Problems taking medications regularly: No    Medication side effects: none  Diet: low salt    Problem list and histories reviewed & adjusted, as indicated.  Additional history: as documented    Patient Active Problem List   Diagnosis     Benign essential hypertension     CKD (chronic kidney disease) stage 3, GFR 30-59 ml/min     History reviewed. No pertinent surgical history.    Social History   Substance Use Topics     Smoking status: Never Smoker     Smokeless tobacco: Never Used     Alcohol use No     Family History   Problem Relation Age of Onset     Unknown/Adopted Mother      Unknown/Adopted Father      DIABETES No family hx of      Coronary Artery Disease No family hx of      Hypertension No family hx of      Hyperlipidemia No family hx of      CEREBROVASCULAR DISEASE No family hx of      Breast Cancer No family hx of      Colon Cancer No family hx of      Prostate Cancer No family hx of      Other Cancer No family hx of      Depression No family hx of      Anxiety Disorder No family hx of      MENTAL ILLNESS No family hx of      Substance Abuse No family hx of      Anesthesia Reaction No family hx of      Asthma No family hx of      OSTEOPOROSIS No family hx of      Genetic Disorder No family hx of      Thyroid Disease No family hx of      Obesity No family hx of          Current Outpatient Prescriptions   Medication Sig Dispense Refill     amLODIPine (NORVASC) 10 MG tablet Take 1 tablet (10 mg) by mouth daily 90 tablet 4     lisinopril (PRINIVIL/ZESTRIL) 20 MG tablet Take 1 tablet (20 mg) by mouth daily 90 tablet 4      "aspirin 81 MG tablet Take 81 mg by mouth daily       [DISCONTINUED] amLODIPine (NORVASC) 10 MG tablet TAKE ONE TABLET BY MOUTH EVERY DAY 30 tablet 10     [DISCONTINUED] lisinopril (PRINIVIL/ZESTRIL) 40 MG tablet Take 0.5 tablets (20 mg) by mouth daily (reduced due to raising creatinine) 30 tablet 1     BP Readings from Last 3 Encounters:   08/22/17 138/88   05/25/17 138/86   05/12/17 (!) 152/98    Wt Readings from Last 3 Encounters:   08/22/17 229 lb (103.9 kg)   05/25/17 230 lb 1.6 oz (104.4 kg)   04/14/17 231 lb (104.8 kg)                        Reviewed and updated as needed this visit by clinical staffTobacco  Allergies  Meds  Med Hx  Surg Hx  Fam Hx  Soc Hx      Reviewed and updated as needed this visit by Provider         ROS:  Constitutional, HEENT, cardiovascular, pulmonary, gi and gu systems are negative, except as otherwise noted.      OBJECTIVE:   /88  Pulse 101  Temp 98.6  F (37  C) (Tympanic)  Resp 16  Ht 5' 8\" (1.727 m)  Wt 229 lb (103.9 kg)  SpO2 100%  BMI 34.82 kg/m2  Body mass index is 34.82 kg/(m^2).  GENERAL: healthy, alert and no distress  NECK: no adenopathy, no asymmetry, masses, or scars and thyroid normal to palpation  RESP: lungs clear to auscultation - no rales, rhonchi or wheezes  CV: regular rate and rhythm, normal S1 S2, no S3 or S4, no murmur, click or rub, no peripheral edema and peripheral pulses strong  MS: no gross musculoskeletal defects noted, no edema  SKIN: no suspicious lesions or rashes  PSYCH: mentation appears normal, affect normal/bright    Diagnostic Test Results:  none     ASSESSMENT/PLAN:         ICD-10-CM    1. Benign essential hypertension I10 amLODIPine (NORVASC) 10 MG tablet     lisinopril (PRINIVIL/ZESTRIL) 20 MG tablet   2. CKD (chronic kidney disease) stage 3, GFR 30-59 ml/min N18.3        Recheck in 6 months. Provided refills for him as he is going back to [place] in October for 6 months. He can recheck his labs and follow up upon return. "     Patient Instructions       Controlling High Blood Pressure  High blood pressure (hypertension) is often called the silent killer. This is because many people who have it don t know it. High blood pressure is defined as 140/90 mm Hg or higher. Know your blood pressure and remember to check it regularly. Doing so can save your life. Here are some things you can do to help control your blood pressure.  Choose heart-healthy foods    Select low-salt, low-fat foods. Limit sodium intake to 2,400 mg per day or the amount suggested by your healthcare provider.    Limit canned, dried, cured, packaged, and fast foods. These can contain a lot of salt.    Eat 8 to 10 servings of fruits and vegetables every day.    Choose lean meats, fish, or chicken.    Eat whole-grain pasta, brown rice, and beans.    Eat 2 to 3 servings of low-fat or fat-free dairy products.    Ask your doctor about the DASH eating plan. This plan helps reduce blood pressure.    When you go to a restaurant, ask that your meal be prepared with no added salt.  Maintain a healthy weight    Ask your healthcare provider how many calories to eat a day. Then stick to that number.    Ask your healthcare provider what weight range is healthiest for you. If you are overweight, a weight loss of only 3% to 5% of your body weight can help lower blood pressure. Generally, a good weight loss goal is to lose 10% of your body weight in a year.    Limit snacks and sweets.    Get regular exercise.  Get up and get active    Choose activities you enjoy. Find ones you can do with friends or family. This includes bicycling, dancing, walking, and jogging.    Park farther away from building entrances.    Use stairs instead of the elevator.    When you can, walk or bike instead of driving.    [place] leaves, garden, or do household repairs.    Be active at a moderate to vigorous level of physical activity for at least 40 minutes for a minimum of 3 to 4 days a week.   Manage  stress    Make time to relax and enjoy life. Find time to laugh.    Communicate your concerns with your loved ones and your healthcare provider.    Visit with family and friends, and keep up with hobbies.  Limit alcohol and quit smoking    Men should have no more than 2 drinks per day.    Women should have no more than 1 drink per day.    Talk with your healthcare provider about quitting smoking. Smoking significantly increases your risk for heart disease and stroke. Ask your healthcare provider about community smoking cessation programs and other options.  Medicines  If lifestyle changes aren t enough, your healthcare provider may prescribe high blood pressure medicine. Take all medicines as prescribed. If you have any questions about your medicines, ask your healthcare provider before stopping or changing them.   Date Last Reviewed: 4/27/2016 2000-2017 [employer]. [address] [place] [address], [place] [address]. All rights reserved. This information is not intended as a substitute for professional medical care. Always follow your healthcare professional's instructions.              Nicole Joy Siegler, PA-C  [facility]

## 2017-08-22 NOTE — PATIENT INSTRUCTIONS
Controlling High Blood Pressure  High blood pressure (hypertension) is often called the silent killer. This is because many people who have it don t know it. High blood pressure is defined as 140/90 mm Hg or higher. Know your blood pressure and remember to check it regularly. Doing so can save your life. Here are some things you can do to help control your blood pressure.  Choose heart-healthy foods    Select low-salt, low-fat foods. Limit sodium intake to 2,400 mg per day or the amount suggested by your healthcare provider.    Limit canned, dried, cured, packaged, and fast foods. These can contain a lot of salt.    Eat 8 to 10 servings of fruits and vegetables every day.    Choose lean meats, fish, or chicken.    Eat whole-grain pasta, brown rice, and beans.    Eat 2 to 3 servings of low-fat or fat-free dairy products.    Ask your doctor about the DASH eating plan. This plan helps reduce blood pressure.    When you go to a restaurant, ask that your meal be prepared with no added salt.  Maintain a healthy weight    Ask your healthcare provider how many calories to eat a day. Then stick to that number.    Ask your healthcare provider what weight range is healthiest for you. If you are overweight, a weight loss of only 3% to 5% of your body weight can help lower blood pressure. Generally, a good weight loss goal is to lose 10% of your body weight in a year.    Limit snacks and sweets.    Get regular exercise.  Get up and get active    Choose activities you enjoy. Find ones you can do with friends or family. This includes bicycling, dancing, walking, and jogging.    Park farther away from building entrances.    Use stairs instead of the elevator.    When you can, walk or bike instead of driving.    [place] leaves, garden, or do household repairs.    Be active at a moderate to vigorous level of physical activity for at least 40 minutes for a minimum of 3 to 4 days a week.   Manage stress    Make time to relax and enjoy  life. Find time to laugh.    Communicate your concerns with your loved ones and your healthcare provider.    Visit with family and friends, and keep up with hobbies.  Limit alcohol and quit smoking    Men should have no more than 2 drinks per day.    Women should have no more than 1 drink per day.    Talk with your healthcare provider about quitting smoking. Smoking significantly increases your risk for heart disease and stroke. Ask your healthcare provider about community smoking cessation programs and other options.  Medicines  If lifestyle changes aren t enough, your healthcare provider may prescribe high blood pressure medicine. Take all medicines as prescribed. If you have any questions about your medicines, ask your healthcare provider before stopping or changing them.   Date Last Reviewed: 4/27/2016 2000-2017 [employer]. [address] [place] [address], [place] [address]. All rights reserved. This information is not intended as a substitute for professional medical care. Always follow your healthcare professional's instructions.

## 2017-08-22 NOTE — NURSING NOTE
"Chief Complaint   Patient presents with     Hypertension     Recheck B/P       Initial /88 (BP Location: Left arm, Patient Position: Sitting, Cuff Size: Adult Large)  Pulse 101  Temp 98.6  F (37  C) (Tympanic)  Resp 16  Ht 5' 8\" (1.727 m)  Wt 229 lb (103.9 kg)  SpO2 100%  BMI 34.82 kg/m2 Estimated body mass index is 34.82 kg/(m^2) as calculated from the following:    Height as of this encounter: 5' 8\" (1.727 m).    Weight as of this encounter: 229 lb (103.9 kg).  Medication Reconciliation: complete       Geri Roman LPN  "

## 2017-08-22 NOTE — MR AVS SNAPSHOT
After Visit Summary   8/22/2017    Bharat Bain    MRN: [id number]           Patient Information     Date Of Birth          1945        Visit Information        Provider Department      8/22/2017 1:50 PM Siegler, Nicole Joy, PA-C [facility]        Today's Diagnoses     Benign essential hypertension    -  1    CKD (chronic kidney disease) stage 3, GFR 30-59 ml/min          Care Instructions      Controlling High Blood Pressure  High blood pressure (hypertension) is often called the silent killer. This is because many people who have it don t know it. High blood pressure is defined as 140/90 mm Hg or higher. Know your blood pressure and remember to check it regularly. Doing so can save your life. Here are some things you can do to help control your blood pressure.  Choose heart-healthy foods    Select low-salt, low-fat foods. Limit sodium intake to 2,400 mg per day or the amount suggested by your healthcare provider.    Limit canned, dried, cured, packaged, and fast foods. These can contain a lot of salt.    Eat 8 to 10 servings of fruits and vegetables every day.    Choose lean meats, fish, or chicken.    Eat whole-grain pasta, brown rice, and beans.    Eat 2 to 3 servings of low-fat or fat-free dairy products.    Ask your doctor about the DASH eating plan. This plan helps reduce blood pressure.    When you go to a restaurant, ask that your meal be prepared with no added salt.  Maintain a healthy weight    Ask your healthcare provider how many calories to eat a day. Then stick to that number.    Ask your healthcare provider what weight range is healthiest for you. If you are overweight, a weight loss of only 3% to 5% of your body weight can help lower blood pressure. Generally, a good weight loss goal is to lose 10% of your body weight in a year.    Limit snacks and sweets.    Get regular exercise.  Get up and get active    Choose activities you enjoy. Find ones you  can do with friends or family. This includes bicycling, dancing, walking, and jogging.    Park farther away from building entrances.    Use stairs instead of the elevator.    When you can, walk or bike instead of driving.    [place] leaves, garden, or do household repairs.    Be active at a moderate to vigorous level of physical activity for at least 40 minutes for a minimum of 3 to 4 days a week.   Manage stress    Make time to relax and enjoy life. Find time to laugh.    Communicate your concerns with your loved ones and your healthcare provider.    Visit with family and friends, and keep up with hobbies.  Limit alcohol and quit smoking    Men should have no more than 2 drinks per day.    Women should have no more than 1 drink per day.    Talk with your healthcare provider about quitting smoking. Smoking significantly increases your risk for heart disease and stroke. Ask your healthcare provider about community smoking cessation programs and other options.  Medicines  If lifestyle changes aren t enough, your healthcare provider may prescribe high blood pressure medicine. Take all medicines as prescribed. If you have any questions about your medicines, ask your healthcare provider before stopping or changing them.   Date Last Reviewed: 4/27/2016 2000-2017 [employer]. [address] [place] [address]. All rights reserved. This information is not intended as a substitute for professional medical care. Always follow your healthcare professional's instructions.                Follow-ups after your visit        Who to contact     If you have questions or need follow up information about today's clinic visit or your schedule please contact [facility] directly at [phone].  Normal or non-critical lab and imaging results will be communicated to you by MyChart, letter or phone within 4 business days after the clinic has received the results. If you do not hear from  "us within 7 days, please contact the clinic through [employer] or phone. If you have a critical or abnormal lab result, we will notify you by phone as soon as possible.  Submit refill requests through [employer] or call your pharmacy and they will forward the refill request to us. Please allow 3 business days for your refill to be completed.          Additional Information About Your Visit        [employer] Information     [employer] lets you send messages to your doctor, view your test results, renew your prescriptions, schedule appointments and more. To sign up, go to [email or web address] . Click on \"Log in\" on the left side of the screen, which will take you to the Welcome page. Then click on \"Sign up Now\" on the right side of the page.     You will be asked to enter the access code listed below, as well as some personal information. Please follow the directions to create your username and password.     Your access code is: [email or web address]  Expires: 2017  2:23 PM     Your access code will  in 90 days. If you need help or a new code, please call your [facility] clinic or [phone].        Care EveryWhere ID     This is your Care EveryWhere ID. This could be used by other organizations to access your [facility] medical records  [id number]        Your Vitals Were     Pulse Temperature Respirations Height Pulse Oximetry BMI (Body Mass Index)    101 98.6  F (37  C) (Tympanic) 16 5' 8\" (1.727 m) 100% 34.82 kg/m2       Blood Pressure from Last 3 Encounters:   17 138/88   17 138/86   17 (!) 152/98    Weight from Last 3 Encounters:   17 229 lb (103.9 kg)   17 230 lb 1.6 oz (104.4 kg)   17 231 lb (104.8 kg)              Today, you had the following     No orders found for display         Today's Medication Changes          These changes are accurate as of: 17  2:23 PM.  If you have any questions, ask your nurse or doctor.               These medicines have changed or have updated " prescriptions.        Dose/Directions    amLODIPine 10 MG tablet   Commonly known as:  NORVASC   This may have changed:  See the new instructions.   Used for:  Benign essential hypertension   Changed by:  Siegler, Nicole Joy, PA-C        Dose:  10 mg   Take 1 tablet (10 mg) by mouth daily   Quantity:  90 tablet   Refills:  4       * lisinopril 40 MG tablet   Commonly known as:  PRINIVIL/ZESTRIL   This may have changed:  Another medication with the same name was added. Make sure you understand how and when to take each.   Used for:  Benign essential hypertension        Dose:  20 mg   Take 0.5 tablets (20 mg) by mouth daily (reduced due to raising creatinine)   Quantity:  30 tablet   Refills:  1       * lisinopril 20 MG tablet   Commonly known as:  PRINIVIL/ZESTRIL   This may have changed:  You were already taking a medication with the same name, and this prescription was added. Make sure you understand how and when to take each.   Used for:  Benign essential hypertension   Changed by:  Siegler, Nicole Joy, PA-C        Dose:  20 mg   Take 1 tablet (20 mg) by mouth daily   Quantity:  90 tablet   Refills:  4       * Notice:  This list has 2 medication(s) that are the same as other medications prescribed for you. Read the directions carefully, and ask your doctor or other care provider to review them with you.         Where to get your medicines      These medications were sent to [facility] [place], [place] - [address], [address]  [address], [place] [address], [facility] [address]     Phone:  [phone]     amLODIPine 10 MG tablet    lisinopril 20 MG tablet                Primary Care Provider Office Phone # Fax #    Nicole Joy Siegler, PA-C [phone]       [facility] [address]  [facility] [address]        Equal Access to Services     OSITO YADAV AH: Leland lorenzo Soroseann, waaxda luqadaha, qaybta kaalmada adedereck, andrés christianson [facility]  lapearl farnsworth. So [facility] [phone].    ATENCIÓN: Si habla vanessa, tiene a elise disposición servicios gratuitos de asistencia lingüística. Consuelo al [phone].    We comply with applicable federal civil rights laws and [place] laws. We do not discriminate on the basis of race, color, national origin, age, disability sex, sexual orientation or gender identity.            Thank you!     Thank you for choosing [facility]  for your care. Our goal is always to provide you with excellent care. Hearing back from our patients is one way we can continue to improve our services. Please take a few minutes to complete the written survey that you may receive in the mail after your visit with us. Thank you!             Your Updated Medication List - Protect others around you: Learn how to safely use, store and throw away your medicines at www.disposemymeds.org.          This list is accurate as of: 8/22/17  2:23 PM.  Always use your most recent med list.                   Brand Name Dispense Instructions for use Diagnosis    amLODIPine 10 MG tablet    NORVASC    90 tablet    Take 1 tablet (10 mg) by mouth daily    Benign essential hypertension       aspirin 81 MG tablet      Take 81 mg by mouth daily        * lisinopril 40 MG tablet    PRINIVIL/ZESTRIL    30 tablet    Take 0.5 tablets (20 mg) by mouth daily (reduced due to raising creatinine)    Benign essential hypertension       * lisinopril 20 MG tablet    PRINIVIL/ZESTRIL    90 tablet    Take 1 tablet (20 mg) by mouth daily    Benign essential hypertension       * Notice:  This list has 2 medication(s) that are the same as other medications prescribed for you. Read the directions carefully, and ask your doctor or other care provider to review them with you.

## 2017-12-13 NOTE — NURSING NOTE
"Chief Complaint   Patient presents with     Medicare Visit     72 year old male presents for annual wellness exam.       Initial BP (!) 158/112 (BP Location: Left arm, Patient Position: Chair, Cuff Size: Adult Large)  Pulse 110  Temp 98.1  F (36.7  C) (Tympanic)  Resp 14  Ht 5' 8\" (1.727 m)  Wt 240 lb (108.9 kg)  SpO2 98%  BMI 36.49 kg/m2 Estimated body mass index is 36.49 kg/(m^2) as calculated from the following:    Height as of this encounter: 5' 8\" (1.727 m).    Weight as of this encounter: 240 lb (108.9 kg).  Medication Reconciliation: complete     Geri Roman LPN  "
Home

## 2018-01-03 ENCOUNTER — OFFICE VISIT (OUTPATIENT)
Dept: FAMILY MEDICINE | Facility: CLINIC | Age: 73
End: 2018-01-03
Payer: COMMERCIAL

## 2018-01-03 VITALS
OXYGEN SATURATION: 99 % | SYSTOLIC BLOOD PRESSURE: 162 MMHG | BODY MASS INDEX: 35.46 KG/M2 | HEART RATE: 116 BPM | DIASTOLIC BLOOD PRESSURE: 94 MMHG | TEMPERATURE: 98.1 F | RESPIRATION RATE: 14 BRPM | WEIGHT: 234 LBS | HEIGHT: 68 IN

## 2018-01-03 DIAGNOSIS — N18.30 CKD (CHRONIC KIDNEY DISEASE) STAGE 3, GFR 30-59 ML/MIN (H): ICD-10-CM

## 2018-01-03 DIAGNOSIS — R80.9 MICROALBUMINURIA: ICD-10-CM

## 2018-01-03 DIAGNOSIS — I10 BENIGN ESSENTIAL HYPERTENSION: Primary | ICD-10-CM

## 2018-01-03 PROCEDURE — 80053 COMPREHEN METABOLIC PANEL: CPT | Performed by: FAMILY MEDICINE

## 2018-01-03 PROCEDURE — 82043 UR ALBUMIN QUANTITATIVE: CPT | Performed by: FAMILY MEDICINE

## 2018-01-03 PROCEDURE — 36415 COLL VENOUS BLD VENIPUNCTURE: CPT | Performed by: FAMILY MEDICINE

## 2018-01-03 PROCEDURE — 99214 OFFICE O/P EST MOD 30 MIN: CPT | Performed by: FAMILY MEDICINE

## 2018-01-03 PROCEDURE — 84443 ASSAY THYROID STIM HORMONE: CPT | Performed by: FAMILY MEDICINE

## 2018-01-03 RX ORDER — LISINOPRIL 20 MG/1
20 TABLET ORAL DAILY
Qty: 90 TABLET | Refills: 0 | Status: SHIPPED | OUTPATIENT
Start: 2018-01-03 | End: 2018-06-15

## 2018-01-03 RX ORDER — AMLODIPINE BESYLATE 5 MG/1
5 TABLET ORAL DAILY
Qty: 90 TABLET | Refills: 0 | Status: SHIPPED | OUTPATIENT
Start: 2018-01-03 | End: 2018-03-14

## 2018-01-03 NOTE — LETTER
"January 5, 2018      Bharat Bain  [address]  [place] [address]        Dear ,    We are writing to inform you of your test results.    As discussed on the phone (I left you a message on your answering machine today on 1/5/2018):    1.  Your kidney labs are still abnormal but stable.    I recommend we continue to monitor these labs and re-check the BMP = \"Basic Metabolic Panel\" and \"Micro-Albumin\" when you return in 1 month.  If they are still abnormal, we may need to refer you to see a kidney doctor (Nephrologist) or get a kidney ultrasound study.    2.  Your thyroid level is NORMAL.    Resulted Orders   TSH with free T4 reflex   Result Value Ref Range    TSH 0.71 0.40 - 4.00 mU/L   Comprehensive metabolic panel (BMP + Alb, Alk Phos, ALT, AST, Total. Bili, TP)   Result Value Ref Range    Sodium 133 133 - 144 mmol/L    Potassium 4.7 3.4 - 5.3 mmol/L    Chloride 100 94 - 109 mmol/L    Carbon Dioxide 25 20 - 32 mmol/L    Anion Gap 8 3 - 14 mmol/L    Glucose 145 (H) 70 - 99 mg/dL    Urea Nitrogen 24 7 - 30 mg/dL    Creatinine 1.42 (H) 0.66 - 1.25 mg/dL    GFR Estimate 49 (L) >60 mL/min/1.7m2      Comment:      Non  GFR Calc    GFR Estimate If Black 59 (L) >60 mL/min/1.7m2      Comment:       GFR Calc    Calcium 10.1 8.5 - 10.1 mg/dL    Bilirubin Total 0.2 0.2 - 1.3 mg/dL    Albumin 4.2 3.4 - 5.0 g/dL    Protein Total 8.5 6.8 - 8.8 g/dL    Alkaline Phosphatase 52 40 - 150 U/L    ALT 22 0 - 70 U/L    AST 8 0 - 45 U/L   Albumin Random Urine Quantitative with Creat Ratio   Result Value Ref Range    Creatinine Urine 63 mg/dL    Albumin Urine mg/L 30 mg/L    Albumin Urine mg/g Cr 48.41 (H) 0 - 17 mg/g Cr       If you have any questions or concerns, please call the clinic at the number listed above.       Sincerely,        Nneka Stevenson, DO                "

## 2018-01-03 NOTE — MR AVS SNAPSHOT
"              After Visit Summary   1/3/2018    Bharat Bain    MRN: [id number]           Patient Information     Date Of Birth          1945        Visit Information        Provider Department      1/3/2018 1:10 PM [facility]        Today's Diagnoses     Benign essential hypertension    -  1    CKD (chronic kidney disease) stage 3, GFR 30-59 ml/min          Care Instructions    High Blood Pressure and Chronic Kidney Disease (CKD)  What is high blood pressure?  For CKD patients, a normal blood pressure is 130/80 (or \"130 over 80\"). When blood pressure stays at 140/90 or higher, it is called high blood pressure (hypertension).  How are kidney disease and high blood pressure related?    More than half of the patients who have chronic kidney disease also have high blood pressure.    High blood pressure increases the chance that kidney disease will get worse.    High blood pressure is a major cause of CKD.   Damaged blood vessels send less blood to the important organs in your body, including your kidneys. Your kidneys filter waste from your blood. When your kidneys can't clean your blood as well as they should, this waste builds up in your body.    CKD can also cause high blood pressure. Your kidneys help keep your blood pressure in a healthy range. Controlling your blood pressure will keep your kidneys from getting worse. This will make CKD easier for you and your doctor to manage.  How do I treat high blood pressure and CKD?    Your doctor will give you blood pressure goals to meet and show you how to check your blood pressure at home.    It is important that you check your pressure as directed. High blood pressure often has no symptoms.    Make sure to write down the date, time and result of your readings.    If you take blood pressure medicine, take it before you measure blood pressure. This helps us know if your medicine is working the way it " should.    Stop smoking.    Follow your diet and exercise plan.    Take all medicines as directed.    If you have kidney disease from diabetes or if you have protein in your urine, the best blood pressure medicines for your treatment are angiotensin converting enzyme (ACE) inhibitors or angiotensin receptor blockers (ARB).    If you have any side effects from your blood pressure medicine, tell your doctor. He or she may switch you to a different medicine.  How often should I see my doctor?    Your CKD team will outline a treatment plan for you after you are diagnosed. Most patients come to the clinic 1 or 2 times per year. We'll ask you to come in more often if:    You start a new medicine or we change your medicine dose    Your kidney function is getting worse    You blood pressure is not controlled    At each visit, we will test your blood and urine and measure your blood pressure. (Remember to check your blood pressure at home to help guide your treatment.)    DON'T be afraid to ask questions. We are here to help you.  Other resources  National Kidney Foundation   www.kidney.org  1-795.370.1595  For informational purposes only. Not to replace the advice of your health care provider.   Copyright   2016 [employer]. All rights reserved. [employer] 373322 - 03/16.    Controlling High Blood Pressure  High blood pressure (hypertension) is often called the silent killer. This is because many people who have it don t know it. High blood pressure is defined as 140/90 mm Hg or higher. Know your blood pressure and remember to check it regularly. Doing so can save your life. Here are some things you can do to help control your blood pressure.    Choose heart-healthy foods    Select low-salt, low-fat foods. Limit sodium intake to 2,400 mg per day or the amount suggested by your healthcare provider.    Limit canned, dried, cured, packaged, and fast foods. These can contain a lot of salt.    Eat 8 to 10 servings  of fruits and vegetables every day.    Choose lean meats, fish, or chicken.    Eat whole-grain pasta, brown rice, and beans.    Eat 2 to 3 servings of low-fat or fat-free dairy products.    Ask your doctor about the DASH eating plan. This plan helps reduce blood pressure.    When you go to a restaurant, ask that your meal be prepared with no added salt.  Maintain a healthy weight    Ask your healthcare provider how many calories to eat a day. Then stick to that number.    Ask your healthcare provider what weight range is healthiest for you. If you are overweight, a weight loss of only 3% to 5% of your body weight can help lower blood pressure. Generally, a good weight loss goal is to lose 10% of your body weight in a year.    Limit snacks and sweets.    Get regular exercise.  Get up and get active    Choose activities you enjoy. Find ones you can do with friends or family. This includes bicycling, dancing, walking, and jogging.    Park farther away from building entrances.    Use stairs instead of the elevator.    When you can, walk or bike instead of driving.    [employer] leaves, garden, or do household repairs.    Be active at a moderate to vigorous level of physical activity for at least 40 minutes for a minimum of 3 to 4 days a week.   Manage stress    Make time to relax and enjoy life. Find time to laugh.    Communicate your concerns with your loved ones and your healthcare provider.    Visit with family and friends, and keep up with hobbies.  Limit alcohol and quit smoking    Men should have no more than 2 drinks per day.    Women should have no more than 1 drink per day.    Talk with your healthcare provider about quitting smoking. Smoking significantly increases your risk for heart disease and stroke. Ask your healthcare provider about community smoking cessation programs and other options.  Medicines  If lifestyle changes aren t enough, your healthcare provider may prescribe high blood pressure medicine. Take  "all medicines as prescribed. If you have any questions about your medicines, ask your healthcare provider before stopping or changing them.   Date Last Reviewed: 2016-2017 [employer]. [address] [place] [address], [place] [address]. All rights reserved. This information is not intended as a substitute for professional medical care. Always follow your healthcare professional's instructions.                Follow-ups after your visit        Follow-up notes from your care team     Return in about 1 month (around 2/3/2018).      Who to contact     If you have questions or need follow up information about today's clinic visit or your schedule please contact [facility] directly at [phone].  Normal or non-critical lab and imaging results will be communicated to you by [employer], letter or phone within 4 business days after the clinic has received the results. If you do not hear from us within 7 days, please contact the clinic through [employer] or phone. If you have a critical or abnormal lab result, we will notify you by phone as soon as possible.  Submit refill requests through [employer] or call your pharmacy and they will forward the refill request to us. Please allow 3 business days for your refill to be completed.          Additional Information About Your Visit        [employer] Information     [employer] lets you send messages to your doctor, view your test results, renew your prescriptions, schedule appointments and more. To sign up, go to [email or web address] . Click on \"Log in\" on the left side of the screen, which will take you to the Welcome page. Then click on \"Sign up Now\" on the right side of the page.     You will be asked to enter the access code listed below, as well as some personal information. Please follow the directions to create your username and password.     Your access code is: [email or web address]  Expires: 4/3/2018  1:39 PM     Your access code will  " "in 90 days. If you need help or a new code, please call your [place] clinic or [phone].        Care EveryWhere ID     This is your Care EveryWhere ID. This could be used by other organizations to access your [place] medical records  [id number]        Your Vitals Were     Pulse Temperature Respirations Height Pulse Oximetry BMI (Body Mass Index)    116 98.1  F (36.7  C) (Tympanic) 14 5' 8\" (1.727 m) 99% 35.58 kg/m2       Blood Pressure from Last 3 Encounters:   01/03/18 (!) 162/94   08/22/17 138/88   05/25/17 138/86    Weight from Last 3 Encounters:   01/03/18 234 lb (106.1 kg)   08/22/17 229 lb (103.9 kg)   05/25/17 230 lb 1.6 oz (104.4 kg)              We Performed the Following     Albumin Random Urine Quantitative with Creat Ratio     Comprehensive metabolic panel (BMP + Alb, Alk Phos, ALT, AST, Total. Bili, TP)     TSH with free T4 reflex          Today's Medication Changes          These changes are accurate as of: 1/3/18  1:39 PM.  If you have any questions, ask your nurse or doctor.               These medicines have changed or have updated prescriptions.        Dose/Directions    amLODIPine 5 MG tablet   Commonly known as:  NORVASC   This may have changed:  See the new instructions.   Used for:  Benign essential hypertension   Changed by:  Nneka Stevenson DO        Dose:  5 mg   Take 1 tablet (5 mg) by mouth daily   Quantity:  90 tablet   Refills:  0       * lisinopril 20 MG tablet   Commonly known as:  PRINIVIL/ZESTRIL   This may have changed:  Another medication with the same name was changed. Make sure you understand how and when to take each.   Used for:  Benign essential hypertension   Changed by:  Siegler, Nicole Joy, PA-C        Dose:  20 mg   Take 1 tablet (20 mg) by mouth daily   Quantity:  90 tablet   Refills:  4       * lisinopril 20 MG tablet   Commonly known as:  PRINIVIL/ZESTRIL   This may have changed:  See the new instructions.   Used for:  Benign essential hypertension "   Changed by:  Nneka Stevenson DO        Dose:  20 mg   Take 1 tablet (20 mg) by mouth daily   Quantity:  90 tablet   Refills:  0       * Notice:  This list has 2 medication(s) that are the same as other medications prescribed for you. Read the directions carefully, and ask your doctor or other care provider to review them with you.         Where to get your medicines      These medications were sent to [facility] [address] [facility] - [place], [place] - [address]  [address], [place] [address]     Phone:  [phone]     amLODIPine 5 MG tablet    lisinopril 20 MG tablet                Primary Care Provider Office Phone # Fax #    Nicole Joy Siegler, PA-C [phone]       [facility] [address]  [facility] [address]        Equal Access to Services     OSITO YADAV : Hadii aad ku hadasho Soomaali, waaxda luqadaha, qaybta kaalmada adeegyada, waxay makedain hayaan reny rosas . So [facility] [phone].    ATENCIÓN: Si habla español, tiene a elise disposición servicios gratuitos de asistencia lingüística. Llame al [phone].    We comply with applicable federal civil rights laws and [place] laws. We do not discriminate on the basis of race, color, national origin, age, disability, sex, sexual orientation, or gender identity.            Thank you!     Thank you for choosing [facility]  for your care. Our goal is always to provide you with excellent care. Hearing back from our patients is one way we can continue to improve our services. Please take a few minutes to complete the written survey that you may receive in the mail after your visit with us. Thank you!             Your Updated Medication List - Protect others around you: Learn how to safely use, store and throw away your medicines at www.disposemymeds.org.          This list is accurate as of: 1/3/18  1:39 PM.  Always use your most recent med list.                   Brand Name  Dispense Instructions for use Diagnosis    amLODIPine 5 MG tablet    NORVASC    90 tablet    Take 1 tablet (5 mg) by mouth daily    Benign essential hypertension       aspirin 81 MG tablet      Take 81 mg by mouth daily        * lisinopril 20 MG tablet    PRINIVIL/ZESTRIL    90 tablet    Take 1 tablet (20 mg) by mouth daily    Benign essential hypertension       * lisinopril 20 MG tablet    PRINIVIL/ZESTRIL    90 tablet    Take 1 tablet (20 mg) by mouth daily    Benign essential hypertension       * Notice:  This list has 2 medication(s) that are the same as other medications prescribed for you. Read the directions carefully, and ask your doctor or other care provider to review them with you.

## 2018-01-03 NOTE — NURSING NOTE
"Chief Complaint   Patient presents with     Hypertension     Recheck       Initial BP (!) 160/98 (BP Location: Left arm, Patient Position: Sitting, Cuff Size: Adult Regular)  Pulse 116  Temp 98.1  F (36.7  C) (Tympanic)  Resp 14  Ht 5' 8\" (1.727 m)  Wt 234 lb (106.1 kg)  SpO2 99%  BMI 35.58 kg/m2 Estimated body mass index is 35.58 kg/(m^2) as calculated from the following:    Height as of this encounter: 5' 8\" (1.727 m).    Weight as of this encounter: 234 lb (106.1 kg).  Medication Reconciliation: complete  "

## 2018-01-03 NOTE — PROGRESS NOTES
"  SUBJECTIVE:   Bharat Bain is a 72 year old male who presents to clinic today for the following health issues:    Hypertension Follow-up      Outpatient blood pressures are being checked at home.  Results are varied within normal range.    Low Salt Diet: no added salt    Amount of exercise or physical activity: 6-7 days/week for an average of 15-30 minutes    Problems taking medications regularly: No    Medication side effects: none    Diet: low salt    Checks BP at home: Systolic 120-130/80-90.  Sometimes has systolic of 150's.  Has been taking Lisinopril since March 2017.  Started Norvasc April 2017.  No side effects.  Takes BP medications at 11 AM typically.      Problem list and histories reviewed & adjusted, as indicated.  Additional history: as documented    Labs reviewed in EPIC    Reviewed and updated as needed this visit by clinical staffTobacco  Allergies  Meds  Problems  Med Hx  Surg Hx  Fam Hx  Soc Hx        Reviewed and updated as needed this visit by Provider  Allergies  Meds  Problems         ROS:  C: NEGATIVE for fever, chills, change in weight  I: NEGATIVE for worrisome rashes, moles or lesions  E: NEGATIVE for vision changes or irritation  E/M: NEGATIVE for ear, mouth and throat problems  R: NEGATIVE for significant cough or SOB  CV: NEGATIVE for chest pain, palpitations or peripheral edema  GI: NEGATIVE for nausea, abdominal pain, heartburn, or change in bowel habits  : NEGATIVE for frequency, dysuria, or hematuria  M: NEGATIVE for significant arthralgias or myalgia  H: NEGATIVE for bleeding problems    OBJECTIVE:     BP (!) 162/94  Pulse 116  Temp 98.1  F (36.7  C) (Tympanic)  Resp 14  Ht 5' 8\" (1.727 m)  Wt 234 lb (106.1 kg)  SpO2 99%  BMI 35.58 kg/m2  Body mass index is 35.58 kg/(m^2).   GENERAL: healthy, alert and no distress  EYES: Eyes grossly normal to inspection, PERRL and conjunctivae and sclerae normal  HENT: ear canals and TM's normal, nose and mouth without " ulcers or lesions  NECK: no adenopathy, no asymmetry, masses, or scars and thyroid normal to palpation  RESP: lungs clear to auscultation - no rales, rhonchi or wheezes  CV: regular rate and rhythm, normal S1 S2, no S3 or S4, no murmur, click or rub, no peripheral edema and peripheral pulses strong  ABDOMEN: soft, nontender, no hepatosplenomegaly, no masses and bowel sounds normal  MS: no gross musculoskeletal defects noted, no edema  SKIN: no suspicious lesions or rashes  NEURO: Normal strength and tone, mentation intact and speech normal  PSYCH: mentation appears normal, affect normal/bright    Diagnostic Test Results:  CMP, TSH, micro-albumin  ASSESSMENT/PLAN:     Problem List Items Addressed This Visit     Benign essential hypertension - Primary    Relevant Medications    lisinopril (PRINIVIL/ZESTRIL) 20 MG tablet    amLODIPine (NORVASC) 5 MG tablet    Other Relevant Orders    TSH with free T4 reflex (Completed)    Comprehensive metabolic panel (BMP + Alb, Alk Phos, ALT, AST, Total. Bili, TP) (Completed)    Albumin Random Urine Quantitative with Creat Ratio (Completed)    CKD (chronic kidney disease) stage 3, GFR 30-59 ml/min    Relevant Orders    TSH with free T4 reflex (Completed)    Comprehensive metabolic panel (BMP + Alb, Alk Phos, ALT, AST, Total. Bili, TP) (Completed)    Albumin Random Urine Quantitative with Creat Ratio (Completed)         BP typically higher at clinic due to white coat syndrome.  Refilled Lisinopril/Amlodipine.   Labs: TSH, CMP, and micro-albumin.   Changed lisinopril to 20 mg tabs so he does not need to cut 40 mg tabs in half.  Will return in 1 month to re-check BP and will bring in BP log as well to make sure BP controlled at home.      Nneka Stevenson, DO  [facility]

## 2018-01-03 NOTE — PATIENT INSTRUCTIONS
"High Blood Pressure and Chronic Kidney Disease (CKD)  What is high blood pressure?  For CKD patients, a normal blood pressure is 130/80 (or \"130 over 80\"). When blood pressure stays at 140/90 or higher, it is called high blood pressure (hypertension).  How are kidney disease and high blood pressure related?    More than half of the patients who have chronic kidney disease also have high blood pressure.    High blood pressure increases the chance that kidney disease will get worse.    High blood pressure is a major cause of CKD.   Damaged blood vessels send less blood to the important organs in your body, including your kidneys. Your kidneys filter waste from your blood. When your kidneys can't clean your blood as well as they should, this waste builds up in your body.    CKD can also cause high blood pressure. Your kidneys help keep your blood pressure in a healthy range. Controlling your blood pressure will keep your kidneys from getting worse. This will make CKD easier for you and your doctor to manage.  How do I treat high blood pressure and CKD?    Your doctor will give you blood pressure goals to meet and show you how to check your blood pressure at home.    It is important that you check your pressure as directed. High blood pressure often has no symptoms.    Make sure to write down the date, time and result of your readings.    If you take blood pressure medicine, take it before you measure blood pressure. This helps us know if your medicine is working the way it should.    Stop smoking.    Follow your diet and exercise plan.    Take all medicines as directed.    If you have kidney disease from diabetes or if you have protein in your urine, the best blood pressure medicines for your treatment are angiotensin converting enzyme (ACE) inhibitors or angiotensin receptor blockers (ARB).    If you have any side effects from your blood pressure medicine, tell your doctor. He or she may switch you to a different " medicine.  How often should I see my doctor?    Your CKD team will outline a treatment plan for you after you are diagnosed. Most patients come to the clinic 1 or 2 times per year. We'll ask you to come in more often if:    You start a new medicine or we change your medicine dose    Your kidney function is getting worse    You blood pressure is not controlled    At each visit, we will test your blood and urine and measure your blood pressure. (Remember to check your blood pressure at home to help guide your treatment.)    DON'T be afraid to ask questions. We are here to help you.  Other resources  National Kidney Foundation   www.kidney.org  2-847-398-9150  For informational purposes only. Not to replace the advice of your health care provider.   Copyright   2016 [facility]. All rights reserved. [employer] 234863   03/16.    Controlling High Blood Pressure  High blood pressure (hypertension) is often called the silent killer. This is because many people who have it don t know it. High blood pressure is defined as 140/90 mm Hg or higher. Know your blood pressure and remember to check it regularly. Doing so can save your life. Here are some things you can do to help control your blood pressure.    Choose heart-healthy foods    Select low-salt, low-fat foods. Limit sodium intake to 2,400 mg per day or the amount suggested by your healthcare provider.    Limit canned, dried, cured, packaged, and fast foods. These can contain a lot of salt.    Eat 8 to 10 servings of fruits and vegetables every day.    Choose lean meats, fish, or chicken.    Eat whole-grain pasta, brown rice, and beans.    Eat 2 to 3 servings of low-fat or fat-free dairy products.    Ask your doctor about the DASH eating plan. This plan helps reduce blood pressure.    When you go to a restaurant, ask that your meal be prepared with no added salt.  Maintain a healthy weight    Ask your healthcare provider how many calories to eat a day. Then  stick to that number.    Ask your healthcare provider what weight range is healthiest for you. If you are overweight, a weight loss of only 3% to 5% of your body weight can help lower blood pressure. Generally, a good weight loss goal is to lose 10% of your body weight in a year.    Limit snacks and sweets.    Get regular exercise.  Get up and get active    Choose activities you enjoy. Find ones you can do with friends or family. This includes bicycling, dancing, walking, and jogging.    Park farther away from building entrances.    Use stairs instead of the elevator.    When you can, walk or bike instead of driving.    [place] leaves, garden, or do household repairs.    Be active at a moderate to vigorous level of physical activity for at least 40 minutes for a minimum of 3 to 4 days a week.   Manage stress    Make time to relax and enjoy life. Find time to laugh.    Communicate your concerns with your loved ones and your healthcare provider.    Visit with family and friends, and keep up with hobbies.  Limit alcohol and quit smoking    Men should have no more than 2 drinks per day.    Women should have no more than 1 drink per day.    Talk with your healthcare provider about quitting smoking. Smoking significantly increases your risk for heart disease and stroke. Ask your healthcare provider about community smoking cessation programs and other options.  Medicines  If lifestyle changes aren t enough, your healthcare provider may prescribe high blood pressure medicine. Take all medicines as prescribed. If you have any questions about your medicines, ask your healthcare provider before stopping or changing them.   Date Last Reviewed: 4/27/2016 2000-2017 [employer]. [address] [place] [address], [place] [address]. All rights reserved. This information is not intended as a substitute for professional medical care. Always follow your healthcare professional's instructions.

## 2018-01-04 LAB
ALBUMIN SERPL-MCNC: 4.2 G/DL (ref 3.4–5)
ALP SERPL-CCNC: 52 U/L (ref 40–150)
ALT SERPL W P-5'-P-CCNC: 22 U/L (ref 0–70)
ANION GAP SERPL CALCULATED.3IONS-SCNC: 8 MMOL/L (ref 3–14)
AST SERPL W P-5'-P-CCNC: 8 U/L (ref 0–45)
BILIRUB SERPL-MCNC: 0.2 MG/DL (ref 0.2–1.3)
BUN SERPL-MCNC: 24 MG/DL (ref 7–30)
CALCIUM SERPL-MCNC: 10.1 MG/DL (ref 8.5–10.1)
CHLORIDE SERPL-SCNC: 100 MMOL/L (ref 94–109)
CO2 SERPL-SCNC: 25 MMOL/L (ref 20–32)
CREAT SERPL-MCNC: 1.42 MG/DL (ref 0.66–1.25)
CREAT UR-MCNC: 63 MG/DL
GFR SERPL CREATININE-BSD FRML MDRD: 49 ML/MIN/1.7M2
GLUCOSE SERPL-MCNC: 145 MG/DL (ref 70–99)
MICROALBUMIN UR-MCNC: 30 MG/L
MICROALBUMIN/CREAT UR: 48.41 MG/G CR (ref 0–17)
POTASSIUM SERPL-SCNC: 4.7 MMOL/L (ref 3.4–5.3)
PROT SERPL-MCNC: 8.5 G/DL (ref 6.8–8.8)
SODIUM SERPL-SCNC: 133 MMOL/L (ref 133–144)
TSH SERPL DL<=0.005 MIU/L-ACNC: 0.71 MU/L (ref 0.4–4)

## 2018-02-07 ENCOUNTER — OFFICE VISIT (OUTPATIENT)
Dept: FAMILY MEDICINE | Facility: CLINIC | Age: 73
End: 2018-02-07
Payer: COMMERCIAL

## 2018-02-07 VITALS
HEIGHT: 68 IN | RESPIRATION RATE: 14 BRPM | TEMPERATURE: 98.4 F | WEIGHT: 230 LBS | BODY MASS INDEX: 34.86 KG/M2 | OXYGEN SATURATION: 100 % | DIASTOLIC BLOOD PRESSURE: 90 MMHG | HEART RATE: 99 BPM | SYSTOLIC BLOOD PRESSURE: 142 MMHG

## 2018-02-07 DIAGNOSIS — R80.9 MICROALBUMINURIA: ICD-10-CM

## 2018-02-07 DIAGNOSIS — R73.9 HYPERGLYCEMIA: Primary | ICD-10-CM

## 2018-02-07 DIAGNOSIS — I10 BENIGN ESSENTIAL HYPERTENSION: ICD-10-CM

## 2018-02-07 DIAGNOSIS — N18.30 CKD (CHRONIC KIDNEY DISEASE) STAGE 3, GFR 30-59 ML/MIN (H): ICD-10-CM

## 2018-02-07 LAB — HBA1C MFR BLD: 5.2 % (ref 4.3–6)

## 2018-02-07 PROCEDURE — 99214 OFFICE O/P EST MOD 30 MIN: CPT | Performed by: FAMILY MEDICINE

## 2018-02-07 PROCEDURE — 82043 UR ALBUMIN QUANTITATIVE: CPT | Performed by: FAMILY MEDICINE

## 2018-02-07 PROCEDURE — 83036 HEMOGLOBIN GLYCOSYLATED A1C: CPT | Performed by: FAMILY MEDICINE

## 2018-02-07 PROCEDURE — 36415 COLL VENOUS BLD VENIPUNCTURE: CPT | Performed by: FAMILY MEDICINE

## 2018-02-07 PROCEDURE — 80048 BASIC METABOLIC PNL TOTAL CA: CPT | Performed by: FAMILY MEDICINE

## 2018-02-07 NOTE — PATIENT INSTRUCTIONS
Low-Salt Choices  Eating salt (sodium) can make your body retain too much water. Excess water makes your heart work harder. Canned, packaged, and frozen foods are easy to prepare. But they are often high in sodium. Here are some ideas for low-salt foods you can easily make yourself.    For breakfast    Fruit or 100% fruit juice    Whole-wheat bread or an English muffin. Look for sodium content on Nutrition Facts labels.    Low-fat milk or yogurt    Unsalted eggs    Shredded wheat    Corn tortillas    Unsalted steamed rice    Regular (not instant) hot cereal, made without salt  Stay away from:    Sausage, gunderson, and ham    Flour tortillas    Packaged muffins, pancakes, and biscuits    Instant hot cereals    Cottage cheese  For lunch and dinner    Fresh fish, chicken, turkey, or meat baked, broiled, or roasted without salt    Dry beans, cooked without salt    Tofu, stir-fried without salt    Unsalted fresh fruit and vegetables, or frozen or canned fruit and vegetables with no added salt  Stay away from:    Lunch or deli meat that is cured or smoked    Cheese    Tomato juice and ketchup    Canned vegetables, soups, and fish not labeled as no-salt-added or reduced sodium    Packaged gravies and sauces    Olives, pickles, and relish    Bottled salad dressings  For snacks and desserts    Yogurt    Unsalted, air-popped popcorn    Unsalted nuts or seeds  Stay away from:    Pies and cakes    Packaged dessert mixes    Pizza    Canned and packaged puddings    Pretzels, chips, crackers, and nuts unless the label says unsalted  Date Last Reviewed: 6/1/2017 2000-2017 [employer]. [address] [place] [address]. All rights reserved. This information is not intended as a substitute for professional medical care. Always follow your healthcare professional's instructions.        Eating Heart-Healthy Food: Using the DASH Plan    Eating for your heart doesn t have to be hard or boring. You just need to  know how to make healthier choices. The DASH eating plan has been developed to help you do just that. DASH stands for Dietary Approaches to Stop Hypertension. It is a plan that has been proven to be healthier for your heart and to lower your risk for high blood pressure. It can also help lower your risk for cancer, heart disease, osteoporosis, and diabetes.  Choosing from each food group  Choose foods from each of the food groups below each day. Try to get the recommended number of servings for each food group. The serving numbers are based on a diet of 2,000 calories a day. Talk to your doctor if you re unsure about your calorie needs. Along with getting the correct servings, the DASH plan also recommends a sodium intake less than 2,300 mg per day.        Grains  Servings: 6 to 8 a day  A serving is:    1 slice bread    1 ounce dry cereal    Half a cup cooked rice, pasta or cereal  Best choices: Whole grains and any grains high in fiber. Vegetables  Servings: 4 to 5 a day  A serving is:    1 cup raw leafy vegetable    Half a cup cut-up raw or cooked vegetable    Half a cup vegetable juice  Best choices: Fresh or frozen vegetables prepared without added salt or fat.   Fruits  Servings: 4 to 5 a day  A serving is:    1 medium fruit    One-quarter cup dried fruit    Half a cup fresh, frozen, or canned fruit    Half a cup of 100% fruit juices  Best choices: A variety of fresh fruits of different colors. Whole fruits are a better choice than fruit juices. Low-fat or fat-free dairy  Servings: 2 to 3 a day  A serving is:    1 cup milk    1 cup yogurt    One and a half ounces cheese  Best choices: Skim or 1% milk, low-fat or fat-free yogurt or buttermilk, and low-fat cheeses.         Lean meats, poultry, fish  Servings: 6 or fewer a day  A serving is:    1 ounce cooked meats, poultry, or fish    1 egg  Best choices: Lean poultry and fish. Trim away visible fat. Broil, grill, roast, or boil instead of frying. Remove skin  from poultry before eating. Limit how much red meat you eat.  Nuts, seeds, beans  Servings: 4 to 5 a week  A serving is:    One-third cup nuts (one and a half ounces)    2 tablespoons nut butter or seeds    Half a cup cooked dry beans or legumes  Best choices: Dry roasted nuts with no salt added, lentils, kidney beans, garbanzo beans, and whole malhotra beans.   Fats and oils  Servings: 2 to 3 a day  A serving is:    1 teaspoon vegetable oil    1 teaspoon soft margarine    1 tablespoon mayonnaise    2 tablespoons salad dressing  Best choices: Nut and vegetable oils (nontropical vegetable oils), such as olive and canola oil. Sweets  Servings: 5 a week or fewer  A serving is:    1 tablespoon sugar, maple syrup, or honey    1 tablespoon jam or jelly    1 half-ounce jelly beans (about 15)    1 cup lemonade  Best choices: Dried fruit can be a satisfying sweet. Choose low-fat sweets. And watch your serving sizes!      For more on the DASH eating plan, visit:  www.nhlbi.nih.gov/health/health-topics/topics/dash   Date Last Reviewed: 6/1/2016 2000-2017 [employer]. [address] [place] [address], [place] [address]. All rights reserved. This information is not intended as a substitute for professional medical care. Always follow your healthcare professional's instructions.

## 2018-02-07 NOTE — PROGRESS NOTES
"  SUBJECTIVE:   Bharat Bain is a 72 year old male who presents to clinic today for the following health issues:        Hypertension Follow-up      Outpatient blood pressures are being checked at home and store.  Results are See flow sheet.    Low Salt Diet: no added salt    Amount of exercise or physical activity: 4-5 days/week for an average of 30-45 minutes    Problems taking medications regularly: No    Medication side effects: none    Diet: low salt    Checks BP at home: Systolic 120-130/80-85.  Blood pressures generally more controlled at home vs at the doctor's office due to white coat syndrome/anxiety.  Has been taking Lisinopril since March 2017.  Started Norvasc April 2017.  No side effects form these meds.  Takes BP medications at 11 AM typically.    Problem list and histories reviewed & adjusted, as indicated.  Additional history: as documented    Labs reviewed in EPIC    Reviewed and updated as needed this visit by clinical staff  Tobacco  Allergies  Meds  Problems  Med Hx  Surg Hx  Fam Hx  Soc Hx        Reviewed and updated as needed this visit by Provider  Allergies  Meds  Problems         ROS:  C: NEGATIVE for fever, chills, change in weight  I: NEGATIVE for worrisome rashes, moles or lesions  E: NEGATIVE for vision changes or irritation  E/M: NEGATIVE for ear, mouth and throat problems  R: NEGATIVE for significant cough or SOB  CV: NEGATIVE for chest pain, palpitations or peripheral edema  GI: NEGATIVE for nausea, abdominal pain, heartburn, or change in bowel habits  : NEGATIVE for frequency, dysuria, or hematuria  M: NEGATIVE for significant arthralgias or myalgia  H: NEGATIVE for bleeding problems    OBJECTIVE:     /90  Pulse 99  Temp 98.4  F (36.9  C) (Tympanic)  Resp 14  Ht 5' 8\" (1.727 m)  Wt 230 lb (104.3 kg)  SpO2 100%  BMI 34.97 kg/m2  Body mass index is 34.97 kg/(m^2).   GENERAL: healthy, alert and no distress  EYES: Eyes grossly normal to inspection, PERRL and " conjunctivae and sclerae normal  HENT: ear canals and TM's normal, nose and mouth without ulcers or lesions  NECK: no adenopathy, no asymmetry, masses, or scars and thyroid normal to palpation  RESP: lungs clear to auscultation - no rales, rhonchi or wheezes  CV: regular rate and rhythm, normal S1 S2, no S3 or S4, no murmur, click or rub, no peripheral edema and peripheral pulses strong  ABDOMEN: soft, nontender, no hepatosplenomegaly, no masses and bowel sounds normal  MS: no gross musculoskeletal defects noted, no edema  SKIN: no suspicious lesions or rashes  NEURO: Normal strength and tone, mentation intact and speech normal  PSYCH: mentation appears normal, affect normal/bright    Diagnostic Test Results:  BMP, HgbA1c, Nutrition referral  ASSESSMENT/PLAN:     Problem List Items Addressed This Visit     Benign essential hypertension    CKD (chronic kidney disease) stage 3, GFR 30-59 ml/min      Other Visit Diagnoses     Hyperglycemia    -  Primary    Relevant Orders    Hemoglobin A1c (Completed)    Basic metabolic panel  (Ca, Cl, CO2, Creat, Gluc, K, Na, BUN) (Completed)    NUTRITION REFERRAL    Microalbuminuria        Relevant Orders    Hemoglobin A1c (Completed)    Albumin Random Urine Quantitative with Creat Ratio (Completed)    Basic metabolic panel  (Ca, Cl, CO2, Creat, Gluc, K, Na, BUN) (Completed)    NUTRITION REFERRAL         --Has had elevated blood sugars in the past (due to non-fasting likely), will screen for DM with HgbA1c today.  --Has CKD with HTN and microalbuminuria (1/5/18).  If microabuminuria is abnormal again would consider undiagnosed/untreated DM as secondary cause.  Once DM treated, microalbuminuria should improve.  May consider renal U/S and/or referral to Nephrologist if needed.     --Agreeable to see Nutritionist for dietary advice/recommendations for his multiple co-morbidities, including CKD, HTN and possibly Diabetes  --Will plan to f/u in 1 month or sooner if needed for chronic  health problem management  Nneka Stevenson, [facility]

## 2018-02-07 NOTE — MR AVS SNAPSHOT
After Visit Summary   2/7/2018    Bharat Bain    MRN: [id number]           Patient Information     Date Of Birth          1945        Visit Information        Provider Department      2/7/2018 1:30 PM Nneka Stevenson DO [facility]        Today's Diagnoses     Hyperglycemia    -  1    Microalbuminuria        CKD (chronic kidney disease) stage 3, GFR 30-59 ml/min        Benign essential hypertension          Care Instructions      Low-Salt Choices  Eating salt (sodium) can make your body retain too much water. Excess water makes your heart work harder. Canned, packaged, and frozen foods are easy to prepare. But they are often high in sodium. Here are some ideas for low-salt foods you can easily make yourself.    For breakfast    Fruit or 100% fruit juice    Whole-wheat bread or an English muffin. Look for sodium content on Nutrition Facts labels.    Low-fat milk or yogurt    Unsalted eggs    Shredded wheat    Corn tortillas    Unsalted steamed rice    Regular (not instant) hot cereal, made without salt  Stay away from:    Sausage, gunderson, and ham    Flour tortillas    Packaged muffins, pancakes, and biscuits    Instant hot cereals    Cottage cheese  For lunch and dinner    Fresh fish, chicken, turkey, or meat--baked, broiled, or roasted without salt    Dry beans, cooked without salt    Tofu, stir-fried without salt    Unsalted fresh fruit and vegetables, or frozen or canned fruit and vegetables with no added salt  Stay away from:    Lunch or deli meat that is cured or smoked    Cheese    Tomato juice and ketchup    Canned vegetables, soups, and fish not labeled as no-salt-added or reduced sodium    Packaged gravies and sauces    Olives, pickles, and relish    Bottled salad dressings  For snacks and desserts    Yogurt    Unsalted, air-popped popcorn    Unsalted nuts or seeds  Stay away from:    Pies and cakes    Packaged dessert mixes    Pizza    Canned and  packaged puddings    Pretzels, chips, crackers, and nuts--unless the label says unsalted  Date Last Reviewed: 6/1/2017 2000-2017 [employer]. [address] [place] [address], [place] [address]. All rights reserved. This information is not intended as a substitute for professional medical care. Always follow your healthcare professional's instructions.        Eating Heart-Healthy Food: Using the DASH Plan    Eating for your heart doesn t have to be hard or boring. You just need to know how to make healthier choices. The DASH eating plan has been developed to help you do just that. DASH stands for Dietary Approaches to Stop Hypertension. It is a plan that has been proven to be healthier for your heart and to lower your risk for high blood pressure. It can also help lower your risk for cancer, heart disease, osteoporosis, and diabetes.  Choosing from each food group  Choose foods from each of the food groups below each day. Try to get the recommended number of servings for each food group. The serving numbers are based on a diet of 2,000 calories a day. Talk to your doctor if you re unsure about your calorie needs. Along with getting the correct servings, the DASH plan also recommends a sodium intake less than 2,300 mg per day.        Grains  Servings: 6 to 8 a day  A serving is:    1 slice bread    1 ounce dry cereal    Half a cup cooked rice, pasta or cereal  Best choices: Whole grains and any grains high in fiber. Vegetables  Servings: 4 to 5 a day  A serving is:    1 cup raw leafy vegetable    Half a cup cut-up raw or cooked vegetable    Half a cup vegetable juice  Best choices: Fresh or frozen vegetables prepared without added salt or fat.   Fruits  Servings: 4 to 5 a day  A serving is:    1 medium fruit    One-quarter cup dried fruit    Half a cup fresh, frozen, or canned fruit    Half a cup of 100% fruit juices  Best choices: A variety of fresh fruits of different colors. Whole fruits are a better choice  than fruit juices. Low-fat or fat-free dairy  Servings: 2 to 3 a day  A serving is:    1 cup milk    1 cup yogurt    One and a half ounces cheese  Best choices: Skim or 1% milk, low-fat or fat-free yogurt or buttermilk, and low-fat cheeses.         Lean meats, poultry, fish  Servings: 6 or fewer a day  A serving is:    1 ounce cooked meats, poultry, or fish    1 egg  Best choices: Lean poultry and fish. Trim away visible fat. Broil, grill, roast, or boil instead of frying. Remove skin from poultry before eating. Limit how much red meat you eat.  Nuts, seeds, beans  Servings: 4 to 5 a week  A serving is:    One-third cup nuts (one and a half ounces)    2 tablespoons nut butter or seeds    Half a cup cooked dry beans or legumes  Best choices: Dry roasted nuts with no salt added, lentils, kidney beans, garbanzo beans, and whole malhotra beans.   Fats and oils  Servings: 2 to 3 a day  A serving is:    1 teaspoon vegetable oil    1 teaspoon soft margarine    1 tablespoon mayonnaise    2 tablespoons salad dressing  Best choices: Nut and vegetable oils (nontropical vegetable oils), such as olive and canola oil. Sweets  Servings: 5 a week or fewer  A serving is:    1 tablespoon sugar, maple syrup, or honey    1 tablespoon jam or jelly    1 half-ounce jelly beans (about 15)    1 cup lemonade  Best choices: Dried fruit can be a satisfying sweet. Choose low-fat sweets. And watch your serving sizes!      For more on the DASH eating plan, visit:  www.nhlbi.nih.gov/health/health-topics/topics/dash   Date Last Reviewed: 6/1/2016 2000-2017 [employer]. [address] [place] [address], [place] [address]. All rights reserved. This information is not intended as a substitute for professional medical care. Always follow your healthcare professional's instructions.                Follow-ups after your visit        Additional Services     NUTRITION REFERRAL       Your provider has referred you to: IZABELLA: [facility]  "[facility] [phone]   [email or web address]    Please be aware that coverage of these services is subject to the terms and limitations of your health insurance plan.  Call member services at your health plan with any benefit or coverage questions.      Please bring the following with you to your appointment:    (1) This referral request  (2) Any documents given to you regarding this referral  (3) Any specific questions you have about diet and/or food choices                  Who to contact     If you have questions or need follow up information about today's clinic visit or your schedule please [facility] at [phone].  Normal or non-critical lab and imaging results will be communicated to you by MyChart, letter or phone within 4 business days after the clinic has received the results. If you do not hear from us within 7 days, please contact the clinic through MyChart or phone. If you have a critical or abnormal lab result, we will notify you by phone as soon as possible.  Submit refill requests through [employer] or call your pharmacy and they will forward the refill request to us. Please allow 3 business days for your refill to be completed.          Additional Information About Your Visit        [employer] Information     [employer] lets you send messages to your doctor, view your test results, renew your prescriptions, schedule appointments and more. To sign up, go to [email or web address] . Click on \"Log in\" on the left side of the screen, which will take you to the Welcome page. Then click on \"Sign up Now\" on the right side of the page.     You will be asked to enter the access code listed below, as well as some personal information. Please follow the directions to create your username and password.     Your access code is: [email or web address]  Expires: 4/3/2018  1:39 PM     Your access code will  in 90 days. If you need help or a " "new code, please call your [place] clinic or [phone].        Care EveryWhere ID     This is your Care EveryWhere ID. This could be used by other organizations to access your [place] medical records  [id number]        Your Vitals Were     Pulse Temperature Respirations Height Pulse Oximetry BMI (Body Mass Index)    99 98.4  F (36.9  C) (Tympanic) 14 5' 8\" (1.727 m) 100% 34.97 kg/m2       Blood Pressure from Last 3 Encounters:   02/07/18 142/90   01/03/18 (!) 162/94   08/22/17 138/88    Weight from Last 3 Encounters:   02/07/18 230 lb (104.3 kg)   01/03/18 234 lb (106.1 kg)   08/22/17 229 lb (103.9 kg)              We Performed the Following     Albumin Random Urine Quantitative with Creat Ratio     Basic metabolic panel  (Ca, Cl, CO2, Creat, Gluc, K, Na, BUN)     Hemoglobin A1c     NUTRITION REFERRAL        Primary Care Provider Office Phone # Fax #    [facility] [phone] [address] [facility] [phone]        Equal Access to Services     OSITO YADAV AH: Hadii aad ku hadasho Soomaali, waaxda luqadaha, qaybta kaalmada adeegyada, waxay idiin hayjefferyn reny farnsworth. So [facility] [place] [phone].    ATENCIÓN: Si habla español, tiene a elise disposición servicios gratuitos de asistencia lingüística. Llame al [phone].    We comply with applicable federal civil rights laws and [place] laws. We do not discriminate on the basis of race, color, national origin, age, disability, sex, sexual orientation, or gender identity.            Thank you!     Thank you for choosing [facility]  for your care. Our goal is always to provide you with excellent care. Hearing back from our patients is one way we can continue to improve our services. Please take a few minutes to complete the written survey that you may receive in the mail after your visit with us. Thank you!             Your Updated Medication List - Protect others around " you: Learn how to safely use, store and throw away your medicines at www.disposemymeds.org.          This list is accurate as of 2/7/18  2:55 PM.  Always use your most recent med list.                   Brand Name Dispense Instructions for use Diagnosis    amLODIPine 5 MG tablet    NORVASC    90 tablet    Take 1 tablet (5 mg) by mouth daily    Benign essential hypertension       aspirin 81 MG tablet      Take 81 mg by mouth daily        * lisinopril 20 MG tablet    PRINIVIL/ZESTRIL    90 tablet    Take 1 tablet (20 mg) by mouth daily    Benign essential hypertension       * lisinopril 20 MG tablet    PRINIVIL/ZESTRIL    90 tablet    Take 1 tablet (20 mg) by mouth daily    Benign essential hypertension       * Notice:  This list has 2 medication(s) that are the same as other medications prescribed for you. Read the directions carefully, and ask your doctor or other care provider to review them with you.

## 2018-02-07 NOTE — NURSING NOTE
"Chief Complaint   Patient presents with     Hypertension     Follow up       Initial BP (!) 160/102 (BP Location: Left arm, Patient Position: Sitting, Cuff Size: Adult Regular)  Pulse 99  Temp 98.4  F (36.9  C) (Tympanic)  Resp 14  Ht 5' 8\" (1.727 m)  Wt 230 lb (104.3 kg)  SpO2 100%  BMI 34.97 kg/m2 Estimated body mass index is 34.97 kg/(m^2) as calculated from the following:    Height as of this encounter: 5' 8\" (1.727 m).    Weight as of this encounter: 230 lb (104.3 kg).  Medication Reconciliation: complete     Geri Roman LPN  "

## 2018-02-07 NOTE — LETTER
"February 9, 2018      Bharat Bain  [address]  [place] [address]        Dear ,    We are writing to inform you of your test results.    --Your kidney labs are improving which is great!  I would recommend that we re-check your BMP (\"Basic Metabolic Panel\") and micro-albumin tests in 2-3 months.      --We also did a screen for Diabetes (HgbA1c) and it was 5.2% which is NORMAL.  DIabetes is diagnosed at 6%.  I would recommend that we continue to monitor your HgbA1c and get in re-checked in 6 months.     Resulted Orders   Hemoglobin A1c   Result Value Ref Range    Hemoglobin A1C 5.2 4.3 - 6.0 %   Albumin Random Urine Quantitative with Creat Ratio   Result Value Ref Range    Creatinine Urine 41 mg/dL    Albumin Urine mg/L 12 mg/L    Albumin Urine mg/g Cr 28.81 (H) 0 - 17 mg/g Cr   Basic metabolic panel  (Ca, Cl, CO2, Creat, Gluc, K, Na, BUN)   Result Value Ref Range    Sodium 131 (L) 133 - 144 mmol/L    Potassium 4.7 3.4 - 5.3 mmol/L    Chloride 97 94 - 109 mmol/L    Carbon Dioxide 25 20 - 32 mmol/L    Anion Gap 9 3 - 14 mmol/L    Glucose 95 70 - 99 mg/dL    Urea Nitrogen 17 7 - 30 mg/dL    Creatinine 1.46 (H) 0.66 - 1.25 mg/dL    GFR Estimate 48 (L) >60 mL/min/1.7m2      Comment:      Non  GFR Calc    GFR Estimate If Black 58 (L) >60 mL/min/1.7m2      Comment:       GFR Calc    Calcium 10.0 8.5 - 10.1 mg/dL       If you have any questions or concerns, please call the clinic at the number listed above.       Sincerely,        Nneka Stevenson, DO                "

## 2018-02-08 LAB
ANION GAP SERPL CALCULATED.3IONS-SCNC: 9 MMOL/L (ref 3–14)
BUN SERPL-MCNC: 17 MG/DL (ref 7–30)
CALCIUM SERPL-MCNC: 10 MG/DL (ref 8.5–10.1)
CHLORIDE SERPL-SCNC: 97 MMOL/L (ref 94–109)
CO2 SERPL-SCNC: 25 MMOL/L (ref 20–32)
CREAT SERPL-MCNC: 1.46 MG/DL (ref 0.66–1.25)
CREAT UR-MCNC: 41 MG/DL
GFR SERPL CREATININE-BSD FRML MDRD: 48 ML/MIN/1.7M2
GLUCOSE SERPL-MCNC: 95 MG/DL (ref 70–99)
MICROALBUMIN UR-MCNC: 12 MG/L
MICROALBUMIN/CREAT UR: 28.81 MG/G CR (ref 0–17)
POTASSIUM SERPL-SCNC: 4.7 MMOL/L (ref 3.4–5.3)
SODIUM SERPL-SCNC: 131 MMOL/L (ref 133–144)

## 2018-03-07 ENCOUNTER — OFFICE VISIT (OUTPATIENT)
Dept: FAMILY MEDICINE | Facility: CLINIC | Age: 73
End: 2018-03-07
Payer: COMMERCIAL

## 2018-03-07 VITALS
HEIGHT: 68 IN | DIASTOLIC BLOOD PRESSURE: 80 MMHG | HEART RATE: 104 BPM | OXYGEN SATURATION: 100 % | TEMPERATURE: 98 F | SYSTOLIC BLOOD PRESSURE: 138 MMHG | WEIGHT: 227 LBS | BODY MASS INDEX: 34.4 KG/M2

## 2018-03-07 DIAGNOSIS — R80.9 PROTEINURIA, UNSPECIFIED TYPE: Primary | ICD-10-CM

## 2018-03-07 PROCEDURE — 99213 OFFICE O/P EST LOW 20 MIN: CPT | Performed by: FAMILY MEDICINE

## 2018-03-07 NOTE — PROGRESS NOTES
SUBJECTIVE:   Bharat Bain is a 73 year old male who presents to clinic today for the following health issues:        Hypertension Follow-up      Outpatient blood pressures are being checked at home.  Results are within normal range 130/80.    Low Salt Diet: no added salt      Amount of exercise or physical activity: 2-3 days/week for an average of 15-30 minutes    Problems taking medications regularly: No    Medication side effects: none    Diet: regular (no restrictions)      Checks BP at home: Systolic 120-130/80-85.  Blood pressures generally more controlled at home vs at the doctor's office due to white coat syndrome/anxiety.  Has been taking Lisinopril since March 2017.  Started Norvasc April 2017.  No side effects form these meds.  Takes BP medications at 11 AM typically.  Takes baby Aspirin taking it at least once per week.   Leaving for [facility] to visit family on 3/18/18, will be gone for about 5 months.      Problem list and histories reviewed & adjusted, as indicated.  Additional history: as documented    Labs reviewed in EPIC    Reviewed and updated as needed this visit by clinical staff  Tobacco  Allergies  Meds  Problems  Med Hx  Surg Hx  Fam Hx  Soc Hx        Reviewed and updated as needed this visit by Provider  Allergies  Meds  Problems         ROS:  C: NEGATIVE for fever, chills, change in weight  I: NEGATIVE for worrisome rashes, moles or lesions  E: NEGATIVE for vision changes or irritation  E/M: NEGATIVE for ear, mouth and throat problems  R: NEGATIVE for significant cough or SOB  CV: NEGATIVE for chest pain, palpitations or peripheral edema  GI: NEGATIVE for nausea, abdominal pain, heartburn, or change in bowel habits  : NEGATIVE for frequency, dysuria, or hematuria  M: NEGATIVE for significant arthralgias or myalgia  H: NEGATIVE for bleeding problems    OBJECTIVE:     /80 (BP Location: Right arm, Patient Position: Sitting, Cuff Size: Adult Regular)  Pulse 104  Temp 98  F  "(36.7  C) (Tympanic)  Ht 5' 8\" (1.727 m)  Wt 227 lb (103 kg)  SpO2 100%  BMI 34.52 kg/m2  Body mass index is 34.52 kg/(m^2).   GENERAL: alert and no distress  EYES: Eyes grossly normal to inspection, PERRL and conjunctivae and sclerae normal  HENT: ear canals and TM's normal, nose and mouth without ulcers or lesions  NECK: no adenopathy, no asymmetry, masses, or scars and thyroid normal to palpation  RESP: lungs clear to auscultation - no rales, rhonchi or wheezes  CV: regular rate and rhythm, normal S1 S2, no S3 or S4, no murmur, click or rub, peripheral pulses strong.  +1 pitting edema in b/l LE's  ABDOMEN: soft, nontender, no hepatosplenomegaly, no masses and bowel sounds normal  MS: no gross musculoskeletal defects noted, no edema  SKIN: no suspicious lesions or rashes  NEURO: Normal strength and tone, mentation intact and speech normal  PSYCH: mentation appears normal, affect normal/bright    Diagnostic Test Results:  Microalbumin--(future order) first AM void    ASSESSMENT/PLAN:     Problem List Items Addressed This Visit     None      Visit Diagnoses     Proteinuria, unspecified type    -  Primary    Relevant Orders    Albumin Random Urine Quantitative with Creat Ratio         Previous 2 microalbuminuria screens (random) were positive for protein.  Has history of HTN and takes Lisinopril/Amlodipine.  BP well controlled. Last HGbA1c 5.2% (Feb 2018)   Will obtain first urine void to help r/o orthostatic proteinuria.  If positive again, pt agreeable to obtain 24 hour urine protein with renal U/S to follow if abnormal.  Would also add SPEP/UPEP as part of the workup for proteinuria.  --F/u in 1 week, pt agreeable to assessment and plan above.   Of note, pt will be leaving for [facility] to visit family on 3/18/18, will be gone for about 5 months.  Pt will check to see if he needs refills on his medications before the trip and will call to let us know.    Nneka Stevenson, DO  [facility] " [employer]

## 2018-03-07 NOTE — PATIENT INSTRUCTIONS
Protein in the Urine (Proteinuria)  The kidney filters waste products and unwanted substances from the blood. These waste products end up in the urine. Protein is an important part of the blood and is not filtered out. Normally, there is no protein in the urine.  Proteinuria occurs when some of the normal protein in the bloodstream ends up in the urine. Protein in the urine will show up on a standard urine test.   Protein in the urine can be a sign of serious disease or a harmless temporary condition. For example, heavy exercise or fever can cause temporary proteinuria. This is normal and will go away if the urine test is repeated.  If urine tests continue to show protein in the urine, chronic kidney disease may be present. Common causes of kidney disease include diabetes, high blood pressure, and conditions that cause inflammation in the kidneys.  Protein in the blood helps keep fluids from leaking out of the blood vessels and into the surrounding tissues. Mild or temporary proteinuria doesn't cause any symptoms. However, if too much protein is lost from the body, there may be swelling as fluid leaks from the blood vessels. Swelling may appear in legs, feet, and ankles or elsewhere such as lower back, face and eyelids.  If proteinuria persists on repeat urine testing, more tests will be needed to figure out the cause. If the cause is still unclear, you may be told to see a kidney specialist.  Home care  No special home care is needed until the cause of your proteinuria is known.  Follow-up care  Follow up with your doctor, or as advised.  Call 911   Call 911 or get immediate medical care if any of the following occur:     Severe weakness, dizziness, fainting, drowsiness, or confusion    Chest pain or shortness of breath   When to seek medical advice  Call your healthcare provider right away if any of these occur:     Nausea or vomiting    Unexpected weight gain or swelling in the legs, ankles, or around the eyes   "  Dark colored urine    Decreased or absent urine output  Date Last Reviewed: 6/1/2016 2000-2017 [employer]. [address] [place] [address], [place] [address]. All rights reserved. This information is not intended as a substitute for professional medical care. Always follow your healthcare professional's instructions.      Protein and Kidney Disease  Your body needs the right amount of protein to build muscles, repair tissues and fight infections.   Protein breaks down into waste products, which must be removed by the kidneys. If you have kidney disease, eating too much protein could put stress on your kidneys. You may be able to prevent further damage to your kidneys by limiting the amount of protein you eat.   Try to eat more animal proteins than plant proteins. Animal proteins are \"complete proteins.\" They include meat, poultry, fish, seafood, eggs and milk products. At least half or your daily protein should come from these foods.  Limit your animal proteins to ________ ounces every day.  Examples  1 ounce of protein:     Egg    Chicken wing      cup cottage cheese or a slice of cheese about the size of a nicole (limit to one serving of cheese or cottage cheese each day)  2 ounces of protein:    Chicken leg or thigh    1/4 cup meat, fish or poultry (such as chicken or turkey)    Thin pork chop  3 ounces of protein:    Medium hamburger (a quarter pounder)    Thick pork chop    Medium fish fillet  Tips  1 ounce of meat is about 7 grams of protein.  A 3- to 4-ounce piece of meat is about the size of a deck of playing cards.     1 ounce of cheese is about the size of a nicole.     Calories and Kidney Disease  The energy you get from food is measured in calories. Your body needs calories to function and to stay at a healthy weight.   Since you may be eating less protein, you may also be eating fewer calories. Not eating enough calories may cause your body to break down its own muscles for energy. This also " puts stress on the kidneys.   Eating more fat can be an easy way to take in extra calories without feeling full. The following fats contain about 50 calories per servin teaspoon margarine or butter    1 teaspoon vegetable oil    1 tablespoon salad dressing    2 tablespoons mayonnaise    1/4 cup whipped topping (non-dairy)  These high-calorie foods are low in protein, phosphorus and sodium. Each contains about 100 calories per servin ounces Tank-Aid or lemonade    8 ounces non-cola soda pop    8 ounces apple cider    2 tablespoons jam or jelly    20 jellybeans    2 tablespoons honey or syrup  For informational purposes only. Not to replace the advice of your health care provider. Copyright   2004 [facility]. All rights reserved. [employer] 348495   REV

## 2018-03-07 NOTE — MR AVS SNAPSHOT
After Visit Summary   3/7/2018    Bharat Bain    MRN: [id number]           Patient Information     Date Of Birth          1945        Visit Information        Provider Department      3/7/2018 2:10 PM Nneka Stevenson DO [facility]        Today's Diagnoses     Proteinuria, unspecified type    -  1      Care Instructions      Protein in the Urine (Proteinuria)  The kidney filters waste products and unwanted substances from the blood. These waste products end up in the urine. Protein is an important part of the blood and is not filtered out. Normally, there is no protein in the urine.  Proteinuria occurs when some of the normal protein in the bloodstream ends up in the urine. Protein in the urine will show up on a standard urine test.   Protein in the urine can be a sign of serious disease or a harmless temporary condition. For example, heavy exercise or fever can cause temporary proteinuria. This is normal and will go away if the urine test is repeated.  If urine tests continue to show protein in the urine, chronic kidney disease may be present. Common causes of kidney disease include diabetes, high blood pressure, and conditions that cause inflammation in the kidneys.  Protein in the blood helps keep fluids from leaking out of the blood vessels and into the surrounding tissues. Mild or temporary proteinuria doesn't cause any symptoms. However, if too much protein is lost from the body, there may be swelling as fluid leaks from the blood vessels. Swelling may appear in legs, feet, and ankles or elsewhere such as lower back, face and eyelids.  If proteinuria persists on repeat urine testing, more tests will be needed to figure out the cause. If the cause is still unclear, you may be told to see a kidney specialist.  Home care  No special home care is needed until the cause of your proteinuria is known.  Follow-up care  Follow up with your doctor, or as  "advised.  Call 911   Call 911 or get immediate medical care if any of the following occur:     Severe weakness, dizziness, fainting, drowsiness, or confusion    Chest pain or shortness of breath   When to seek medical advice  Call your healthcare provider right away if any of these occur:     Nausea or vomiting    Unexpected weight gain or swelling in the legs, ankles, or around the eyes    Dark colored urine    Decreased or absent urine output  Date Last Reviewed: 6/1/2016 2000-2017 [employer]. [address] [place] [address]. All rights reserved. This information is not intended as a substitute for professional medical care. Always follow your healthcare professional's instructions.      Protein and Kidney Disease  Your body needs the right amount of protein to build muscles, repair tissues and fight infections.   Protein breaks down into waste products, which must be removed by the kidneys. If you have kidney disease, eating too much protein could put stress on your kidneys. You may be able to prevent further damage to your kidneys by limiting the amount of protein you eat.   Try to eat more animal proteins than plant proteins. Animal proteins are \"complete proteins.\" They include meat, poultry, fish, seafood, eggs and milk products. At least half or your daily protein should come from these foods.  Limit your animal proteins to ________ ounces every day.  Examples  1 ounce of protein:     Egg    Chicken wing      cup cottage cheese or a slice of cheese about the size of a nicole (limit to one serving of cheese or cottage cheese each day)  2 ounces of protein:    Chicken leg or thigh    1/4 cup meat, fish or poultry (such as chicken or turkey)    Thin pork chop  3 ounces of protein:    Medium hamburger (a quarter pounder)    Thick pork chop    Medium fish fillet  Tips  1 ounce of meat is about 7 grams of protein.  A 3- to 4-ounce piece of meat is about the size of a deck of playing " cards.     1 ounce of cheese is about the size of a nicole.     Calories and Kidney Disease  The energy you get from food is measured in calories. Your body needs calories to function and to stay at a healthy weight.   Since you may be eating less protein, you may also be eating fewer calories. Not eating enough calories may cause your body to break down its own muscles for energy. This also puts stress on the kidneys.   Eating more fat can be an easy way to take in extra calories without feeling full. The following fats contain about 50 calories per servin teaspoon margarine or butter    1 teaspoon vegetable oil    1 tablespoon salad dressing    2 tablespoons mayonnaise    1/4 cup whipped topping (non-dairy)  These high-calorie foods are low in protein, phosphorus and sodium. Each contains about 100 calories per servin ounces Tank-Aid or lemonade    8 ounces non-cola soda pop    8 ounces apple cider    2 tablespoons jam or jelly    20 jellybeans    2 tablespoons honey or syrup  For informational purposes only. Not to replace the advice of your health care provider. Copyright   2004 [facility]. All rights reserved. [employer] 164277 - REV             Follow-ups after your visit        Follow-up notes from your care team     Return in about 1 week (around 3/14/2018) for BP Recheck.      Future tests that were ordered for you today     Open Future Orders        Priority Expected Expires Ordered    Albumin Random Urine Quantitative with Creat Ratio Routine  3/7/2019 3/7/2018            Who to contact     If you have questions or need follow up information about today's clinic visit or your schedule please contact [facility] directly at [phone].  Normal or non-critical lab and imaging results will be communicated to you by MyChart, letter or phone within 4 business days after the clinic has received the results. If you do not hear from us within 7 days,  "please contact the clinic through [employer] or phone. If you have a critical or abnormal lab result, we will notify you by phone as soon as possible.  Submit refill requests through [employer] or call your pharmacy and they will forward the refill request to us. Please allow 3 business days for your refill to be completed.          Additional Information About Your Visit        [employer] Information     [employer]ics lets you send messages to your doctor, view your test results, renew your prescriptions, schedule appointments and more. To sign up, go to [email or web address] . Click on \"Log in\" on the left side of the screen, which will take you to the Welcome page. Then click on \"Sign up Now\" on the right side of the page.     You will be asked to enter the access code listed below, as well as some personal information. Please follow the directions to create your username and password.     Your access code is: [email or web address]  Expires: 4/3/2018  1:39 PM     Your access code will  in 90 days. If you need help or a new code, please call your [facility] clinic or [phone].        Care EveryWhere ID     This is your Care EveryWhere ID. This could be used by other organizations to access your [facility] medical records  [id number]        Your Vitals Were     Pulse Temperature Height Pulse Oximetry BMI (Body Mass Index)       104 98  F (36.7  C) (Tympanic) 5' 8\" (1.727 m) 100% 34.52 kg/m2        Blood Pressure from Last 3 Encounters:   18 138/80   18 142/90   18 (!) 162/94    Weight from Last 3 Encounters:   18 227 lb (103 kg)   18 230 lb (104.3 kg)   18 234 lb (106.1 kg)               Primary Care Provider Office Phone # Fax #    [facility] [phone] [address] [facility] [address]        Equal Access to Services     OSITO YADAV AH: Leland Jones, cr terry, andrés crawford" reny suarezjacquiegladis seayaajelly ah. So [facility] [phone].    ATENCIÓN: Si mary anne mendez, tiene a elise disposición servicios gratuitos de asistencia lingüística. Consuelo al [phone].    We comply with applicable federal civil rights laws and [place] laws. We do not discriminate on the basis of race, color, national origin, age, disability, sex, sexual orientation, or gender identity.            Thank you!     Thank you for choosing [facility]  for your care. Our goal is always to provide you with excellent care. Hearing back from our patients is one way we can continue to improve our services. Please take a few minutes to complete the written survey that you may receive in the mail after your visit with us. Thank you!             Your Updated Medication List - Protect others around you: Learn how to safely use, store and throw away your medicines at www.disposemymeds.org.          This list is accurate as of 3/7/18  3:03 PM.  Always use your most recent med list.                   Brand Name Dispense Instructions for use Diagnosis    amLODIPine 5 MG tablet    NORVASC    90 tablet    Take 1 tablet (5 mg) by mouth daily    Benign essential hypertension       aspirin 81 MG tablet      Take 81 mg by mouth daily        * lisinopril 20 MG tablet    PRINIVIL/ZESTRIL    90 tablet    Take 1 tablet (20 mg) by mouth daily    Benign essential hypertension       * lisinopril 20 MG tablet    PRINIVIL/ZESTRIL    90 tablet    Take 1 tablet (20 mg) by mouth daily    Benign essential hypertension       * Notice:  This list has 2 medication(s) that are the same as other medications prescribed for you. Read the directions carefully, and ask your doctor or other care provider to review them with you.

## 2018-03-07 NOTE — NURSING NOTE
"Chief Complaint   Patient presents with     Hypertension     Recheck       Initial /80 (BP Location: Right arm, Patient Position: Sitting, Cuff Size: Adult Regular)  Pulse 104  Temp 98  F (36.7  C) (Tympanic)  Ht 5' 8\" (1.727 m)  Wt 227 lb (103 kg)  SpO2 100%  BMI 34.52 kg/m2 Estimated body mass index is 34.52 kg/(m^2) as calculated from the following:    Height as of this encounter: 5' 8\" (1.727 m).    Weight as of this encounter: 227 lb (103 kg).  Medication Reconciliation: complete     Geri Roman LPN  "

## 2018-03-08 DIAGNOSIS — N18.30 CKD (CHRONIC KIDNEY DISEASE) STAGE 3, GFR 30-59 ML/MIN (H): ICD-10-CM

## 2018-03-08 LAB
CREAT UR-MCNC: 78 MG/DL
MICROALBUMIN UR-MCNC: 7 MG/L
MICROALBUMIN/CREAT UR: 9 MG/G CR (ref 0–17)

## 2018-03-08 PROCEDURE — 82043 UR ALBUMIN QUANTITATIVE: CPT | Performed by: FAMILY MEDICINE

## 2018-03-14 DIAGNOSIS — I10 BENIGN ESSENTIAL HYPERTENSION: ICD-10-CM

## 2018-03-14 NOTE — TELEPHONE ENCOUNTER
Reason for Call:  Medication or medication refill:    Do you use a Oral Pharmacy?  Name of the pharmacy and phone number for the current [facility] [place] [address]    Name of the medication requested: Amlodipine 5 mg and Lisinopril 20 mg    Other request: Patient is going to [place] for 5 months. Insurance will cover for 3 months. Please call patient's son as he has some other questions about how to get more medication. Something about over the counter    Can we leave a detailed message on this number? YES    Phone number patient can be reached at: Other phone number:  [phone]    Best Time: any    Call taken on 3/14/2018 at 1:15 PM by MALLORY WELLER

## 2018-03-15 RX ORDER — LISINOPRIL 20 MG/1
20 TABLET ORAL DAILY
Qty: 90 TABLET | Refills: 0 | Status: SHIPPED | OUTPATIENT
Start: 2018-03-15 | End: 2018-10-10

## 2018-03-15 RX ORDER — AMLODIPINE BESYLATE 5 MG/1
5 TABLET ORAL DAILY
Qty: 90 TABLET | Refills: 0 | Status: SHIPPED | OUTPATIENT
Start: 2018-03-15 | End: 2018-06-15

## 2018-03-15 NOTE — TELEPHONE ENCOUNTER
"Requested Prescriptions   Pending Prescriptions Disp Refills     lisinopril (PRINIVIL/ZESTRIL) 20 MG tablet  Last Written Prescription Date:  1/3/2018  Last Fill Quantity: 90 tablet,  # refills: 0   Last Office Visit  3/7/2018        with  [facility], [facility] or [facility] prescribing provider:     Future Office Visit:    90 tablet 4     Sig: Take 1 tablet (20 mg) by mouth daily    ACE Inhibitors (Including Combos) Protocol Failed    3/14/2018  1:18 PM       Failed - Normal serum creatinine on file in past 12 months    Recent Labs   Lab Test  02/07/18   1502   CR  1.46*            Passed - Blood pressure under 140/90 in past 12 months    BP Readings from Last 3 Encounters:   03/07/18 138/80   02/07/18 142/90   01/03/18 (!) 162/94          Passed - Recent (12 mo) or future (30 days) visit within the authorizing provider's specialty    Patient had office visit in the last 12 months or has a visit in the next 30 days with authorizing provider or within the authorizing provider's specialty.  See \"Patient Info\" tab in inbasket, or \"Choose Columns\" in Meds & Orders section of the refill encounter.           Passed - Patient is age 18 or older       Passed - Normal serum potassium on file in past 12 months    Recent Labs   Lab Test  02/07/18   1502   POTASSIUM  4.7                 amLODIPine (NORVASC) 5 MG tablet  Last Written Prescription Date:  1/3/2018  Last Fill Quantity: 90 tablet,  # refills: 0   Last Office Visit  3/7/2018        with  [facility], [facility] or [facility] prescribing provider:     Future Office Visit:        90 tablet 0     Sig: Take 1 tablet (5 mg) by mouth daily    Calcium Channel Blockers Protocol  Failed    3/14/2018  1:18 PM       Failed - Normal serum creatinine on file in past 12 months    Recent Labs   Lab Test  02/07/18   1502   CR  1.46*            Passed - Blood pressure under 140/90 in past 12 months    BP Readings from Last 3 Encounters:   03/07/18 138/80   02/07/18 142/90   01/03/18 (!) 162/94                " "Passed - Recent (12 mo) or future (30 days) visit within the authorizing provider's specialty    Patient had office visit in the last 12 months or has a visit in the next 30 days with authorizing provider or within the authorizing provider's specialty.  See \"Patient Info\" tab in inbasket, or \"Choose Columns\" in Meds & Orders section of the refill encounter.           Passed - Patient is age 18 or older          "

## 2018-06-15 ENCOUNTER — TELEPHONE (OUTPATIENT)
Dept: FAMILY MEDICINE | Facility: CLINIC | Age: 73
End: 2018-06-15

## 2018-06-15 DIAGNOSIS — I10 BENIGN ESSENTIAL HYPERTENSION: ICD-10-CM

## 2018-06-15 NOTE — TELEPHONE ENCOUNTER
Reason for Call:  Medication or medication refill:    Do you use a [facility]?  Name of the [facility] and phone [facility] [address]    Name of the medication requested: Amlodipine 5 mg and Lisinopril 20 mg    Other request: Patient is traveling again and needs at least a 2 month supply. Call son-in-law if any questions. Patient only has a week left. Need as soon as possible as patient is leaving.    Can we leave a detailed message on this number? YES    Phone number patient can be reached at: Other phone number: [phone]     Best Time: any    Call taken on 6/15/2018 at 3:01 PM by MALLORY WELLER

## 2018-06-15 NOTE — TELEPHONE ENCOUNTER
Informed Chelialexandra pt was to follow up at clinic for BP check in 03/2018. He reports pt is in [place] and will not be back until 08/2018. Asking if doctor will refill until August. If approved, Rx will be sent to pt with a relative to [place]. Advised pt see doctor were he is currently. Please advice if agree with plan above or if Rx can be approved.    Requested Prescriptions   Pending Prescriptions Disp Refills     amLODIPine (NORVASC) 5 MG tablet 90 tablet 0     Sig: Take 1 tablet (5 mg) by mouth daily    There is no refill protocol information for this order        lisinopril (PRINIVIL/ZESTRIL) 20 MG tablet 90 tablet 0     Sig: Take 1 tablet (20 mg) by mouth daily    There is no refill protocol information for this order

## 2018-06-18 RX ORDER — AMLODIPINE BESYLATE 5 MG/1
5 TABLET ORAL DAILY
Qty: 90 TABLET | Refills: 0 | Status: SHIPPED | OUTPATIENT
Start: 2018-06-18 | End: 2018-10-08

## 2018-06-18 RX ORDER — LISINOPRIL 20 MG/1
20 TABLET ORAL DAILY
Qty: 90 TABLET | Refills: 0 | Status: SHIPPED | OUTPATIENT
Start: 2018-06-18 | End: 2018-10-08

## 2018-10-08 DIAGNOSIS — I10 BENIGN ESSENTIAL HYPERTENSION: ICD-10-CM

## 2018-10-08 NOTE — TELEPHONE ENCOUNTER
"Requested Prescriptions   Pending Prescriptions Disp Refills     amLODIPine (NORVASC) 5 MG tablet  Last Written Prescription Date:  6/18/18  Last Fill Quantity: 90,  # refills: 0   Last office visit: 3/7/2018 with prescribing provider:  Elva Islas Office Visit:   Next 5 appointments (look out 90 days)     Oct 10, 2018  5:40 PM CDT   Office Visit with Shoaib Deras MD   [facility] ([address] [address]   [phone]                  90 tablet 0     Sig: Take 1 tablet (5 mg) by mouth daily    Calcium Channel Blockers Protocol  Failed    10/8/2018  9:55 AM       Failed - Normal serum creatinine on file in past 12 months    Recent Labs   Lab Test  02/07/18   1502   CR  1.46*            Passed - Blood pressure under 140/90 in past 12 months    BP Readings from Last 3 Encounters:   03/07/18 138/80   02/07/18 142/90   01/03/18 (!) 162/94                Passed - Recent (12 mo) or future (30 days) visit within the authorizing provider's specialty    Patient had office visit in the last 12 months or has a visit in the next 30 days with authorizing provider or within the authorizing provider's specialty.  See \"Patient Info\" tab in inbasket, or \"Choose Columns\" in Meds & Orders section of the refill encounter.           Passed - Patient is age 18 or older        lisinopril (PRINIVIL/ZESTRIL) 20 MG tablet  Last Written Prescription Date:  6/18/18  Last Fill Quantity: 90,  # refills: 0   Last office visit: 3/7/2018 with prescribing provider:  Elva Islas Office Visit:   Next 5 appointments (look out 90 days)     Oct 10, 2018  5:40 PM CDT   Office Visit with Shoaib Deras MD   [facility] ([address] [address]   [phone]                  90 tablet 0     Sig: Take 1 tablet (20 mg) by mouth daily    ACE Inhibitors (Including Combos) Protocol Failed    " "10/8/2018  9:55 AM       Failed - Normal serum creatinine on file in past 12 months    Recent Labs   Lab Test  02/07/18   1502   CR  1.46*            Passed - Blood pressure under 140/90 in past 12 months    BP Readings from Last 3 Encounters:   03/07/18 138/80   02/07/18 142/90   01/03/18 (!) 162/94                Passed - Recent (12 mo) or future (30 days) visit within the authorizing provider's specialty    Patient had office visit in the last 12 months or has a visit in the next 30 days with authorizing provider or within the authorizing provider's specialty.  See \"Patient Info\" tab in inbasket, or \"Choose Columns\" in Meds & Orders section of the refill encounter.           Passed - Patient is age 18 or older       Passed - Normal serum potassium on file in past 12 months    Recent Labs   Lab Test  02/07/18   1502   POTASSIUM  4.7               "

## 2018-10-10 ENCOUNTER — OFFICE VISIT (OUTPATIENT)
Dept: FAMILY MEDICINE | Facility: CLINIC | Age: 73
End: 2018-10-10
Payer: COMMERCIAL

## 2018-10-10 VITALS
BODY MASS INDEX: 35.46 KG/M2 | SYSTOLIC BLOOD PRESSURE: 170 MMHG | TEMPERATURE: 98.2 F | HEIGHT: 68 IN | OXYGEN SATURATION: 98 % | HEART RATE: 120 BPM | WEIGHT: 234 LBS | DIASTOLIC BLOOD PRESSURE: 110 MMHG

## 2018-10-10 DIAGNOSIS — I10 BENIGN ESSENTIAL HYPERTENSION: Primary | ICD-10-CM

## 2018-10-10 DIAGNOSIS — N18.30 CKD (CHRONIC KIDNEY DISEASE) STAGE 3, GFR 30-59 ML/MIN (H): ICD-10-CM

## 2018-10-10 DIAGNOSIS — E66.01 MORBID OBESITY (H): ICD-10-CM

## 2018-10-10 DIAGNOSIS — I16.0 HYPERTENSIVE URGENCY: ICD-10-CM

## 2018-10-10 LAB
ERYTHROCYTE [DISTWIDTH] IN BLOOD BY AUTOMATED COUNT: 17.9 % (ref 10–15)
HCT VFR BLD AUTO: 38.2 % (ref 40–53)
HGB BLD-MCNC: 12.6 G/DL (ref 13.3–17.7)
MCH RBC QN AUTO: 24.7 PG (ref 26.5–33)
MCHC RBC AUTO-ENTMCNC: 33 G/DL (ref 31.5–36.5)
MCV RBC AUTO: 75 FL (ref 78–100)
PLATELET # BLD AUTO: 314 10E9/L (ref 150–450)
RBC # BLD AUTO: 5.11 10E12/L (ref 4.4–5.9)
WBC # BLD AUTO: 6 10E9/L (ref 4–11)

## 2018-10-10 PROCEDURE — 80053 COMPREHEN METABOLIC PANEL: CPT | Performed by: FAMILY MEDICINE

## 2018-10-10 PROCEDURE — 84443 ASSAY THYROID STIM HORMONE: CPT | Performed by: FAMILY MEDICINE

## 2018-10-10 PROCEDURE — 99214 OFFICE O/P EST MOD 30 MIN: CPT | Performed by: FAMILY MEDICINE

## 2018-10-10 PROCEDURE — 36415 COLL VENOUS BLD VENIPUNCTURE: CPT | Performed by: FAMILY MEDICINE

## 2018-10-10 PROCEDURE — 82043 UR ALBUMIN QUANTITATIVE: CPT | Performed by: FAMILY MEDICINE

## 2018-10-10 PROCEDURE — 85027 COMPLETE CBC AUTOMATED: CPT | Performed by: FAMILY MEDICINE

## 2018-10-10 RX ORDER — LISINOPRIL 20 MG/1
20 TABLET ORAL DAILY
Qty: 90 TABLET | Refills: 1 | Status: SHIPPED | OUTPATIENT
Start: 2018-10-10 | End: 2018-12-19

## 2018-10-10 RX ORDER — LISINOPRIL 20 MG/1
20 TABLET ORAL DAILY
Qty: 90 TABLET | Refills: 1 | Status: SHIPPED | OUTPATIENT
Start: 2018-10-10 | End: 2018-10-10

## 2018-10-10 RX ORDER — AMLODIPINE BESYLATE 5 MG/1
5 TABLET ORAL DAILY
Qty: 90 TABLET | Refills: 1 | Status: SHIPPED | OUTPATIENT
Start: 2018-10-10 | End: 2018-10-10

## 2018-10-10 RX ORDER — AMLODIPINE BESYLATE 10 MG/1
10 TABLET ORAL DAILY
Qty: 90 TABLET | Refills: 1 | Status: SHIPPED | OUTPATIENT
Start: 2018-10-10 | End: 2018-10-10

## 2018-10-10 RX ORDER — AMLODIPINE BESYLATE 10 MG/1
10 TABLET ORAL DAILY
Qty: 90 TABLET | Refills: 1 | Status: SHIPPED | OUTPATIENT
Start: 2018-10-10 | End: 2018-12-19

## 2018-10-10 NOTE — MR AVS SNAPSHOT
After Visit Summary   10/10/2018    Bharat Bain    MRN: [id number]           Patient Information     Date Of Birth          1945        Visit Information        Provider Department      10/10/2018 5:40 PM [facility]eras MD [facility]        Today's Diagnoses     Benign essential hypertension    -  1    Morbid obesity (H)        Hypertensive urgency        CKD (chronic kidney disease) stage 3, GFR 30-59 ml/min (H)           Follow-ups after your visit        Follow-up notes from your care team     Return in about 2 weeks (around 10/24/2018) for HTN check.      Your next 10 appointments already scheduled     Oct 24, 2018 10:00 AM CDT   Office Visit with Shoaib Deras MD   [facility] [place] ([facility])    [address] [place] [address] [phone]           Bring a current list of meds and any records pertaining to this visit. For Physicals, please bring immunization records and any forms needing to be filled out. Please arrive 10 minutes early to complete paperwork.              Future tests that were ordered for you today     Open Future Orders        Priority Expected Expires Ordered    Echocardiogram Complete Routine  10/10/2019 10/10/2018            Who to contact     If you have questions or need follow up information about today's clinic visit or your schedule please contact [facility] [place] directly at [phone].  Normal or non-critical lab and imaging results will be communicated to you by MyChart, letter or phone within 4 business days after the clinic has received the results. If you do not hear from us within 7 days, please contact the clinic through MyChart or phone. If you have a critical or abnormal lab result, we will notify you by phone as soon as possible.  Submit refill requests through [employer] or call your pharmacy and they will forward the refill request to us. Please allow 3 business days  "for your refill to be completed.          Additional Information About Your Visit        [employer] Information     [employer] lets you send messages to your doctor, view your test results, renew your prescriptions, schedule appointments and more. To sign up, go to [email or web address] . Click on \"Log in\" on the left side of the screen, which will take you to the Welcome page. Then click on \"Sign up Now\" on the right side of the page.     You will be asked to enter the access code listed below, as well as some personal information. Please follow the directions to create your username and password.     Your access code is: [email or web address]  Expires: 2019  6:38 PM     Your access code will  in 90 days. If you need help or a new code, please call your [facility] clinic or [phone].        Care EveryWhere ID     This is your Care EveryWhere ID. This could be used by other organizations to access your [facility] medical records  [id number]        Your Vitals Were     Pulse Temperature Height Pulse Oximetry BMI (Body Mass Index)       120 98.2  F (36.8  C) (Tympanic) 5' 8\" (1.727 m) 98% 35.58 kg/m2        Blood Pressure from Last 3 Encounters:   10/10/18 (!) 170/110   18 138/80   18 142/90    Weight from Last 3 Encounters:   10/10/18 234 lb (106.1 kg)   18 227 lb (103 kg)   18 230 lb (104.3 kg)              We Performed the Following     Albumin Random Urine Quantitative with Creat Ratio     CBC with platelets     Comprehensive metabolic panel     TSH          Today's Medication Changes          These changes are accurate as of 10/10/18  6:38 PM.  If you have any questions, ask your nurse or doctor.               Start taking these medicines.        Dose/Directions    amLODIPine 10 MG tablet   Commonly known as:  NORVASC   Used for:  Benign essential hypertension   Started by:  Shoaib Deras MD        Dose:  10 mg   Take 1 tablet (10 mg) by mouth daily   Quantity:  90 tablet   Refills:  1       " lisinopril 20 MG tablet   Commonly known as:  PRINIVIL/ZESTRIL   Used for:  Benign essential hypertension   Started by:  Shoaib Deras MD        Dose:  20 mg   Take 1 tablet (20 mg) by mouth daily   Quantity:  90 tablet   Refills:  1            Where to get your medicines      These medications were sent to [facility] [place] - [address]  [address] [facility], [place] [address]     Phone:  [phone]     amLODIPine 10 MG tablet    lisinopril 20 MG tablet                Primary Care Provider Office Phone # Fax #    [facility] [phone] [address] [facility] [address]        Equal Access to Services     OSITO YADAV AH: Hadii laurie mccollum hadasho Soomaali, waaxda luqadaha, qaybta kaalmada adeegyada, waxay makedain hayaan reny rosas . So [facility] [phone].    ATENCIÓN: Si habla español, tiene a elise disposición servicios gratuitos de asistencia lingüística. Llame al [phone].    We comply with applicable federal civil rights laws and [place] laws. We do not discriminate on the basis of race, color, national origin, age, disability, sex, sexual orientation, or gender identity.            Thank you!     Thank you for choosing [facility]  for your care. Our goal is always to provide you with excellent care. Hearing back from our patients is one way we can continue to improve our services. Please take a few minutes to complete the written survey that you may receive in the mail after your visit with us. Thank you!             Your Updated Medication List - Protect others around you: Learn how to safely use, store and throw away your medicines at www.disposemymeds.org.          This list is accurate as of 10/10/18  6:38 PM.  Always use your most recent med list.                   Brand Name Dispense Instructions for use Diagnosis    amLODIPine 10 MG tablet    NORVASC    90 tablet     Take 1 tablet (10 mg) by mouth daily    Benign essential hypertension       aspirin 81 MG tablet      Take 81 mg by mouth daily        lisinopril 20 MG tablet    PRINIVIL/ZESTRIL    90 tablet    Take 1 tablet (20 mg) by mouth daily    Benign essential hypertension

## 2018-10-10 NOTE — PROGRESS NOTES
SUBJECTIVE:   Bharat Bain is a 73 year old male who presents to clinic today for the following health issues:      Hypertension Follow-up  Patient is currently on amlodipine 5 mg along with lisinopril.  His blood pressure is very elevated at home.  He is asymptomatic.  Denies any chest pains no shortness of breath.  His lifestyle is sedentary.  He denies any blurry vision no neurological symptoms.    Outpatient blood pressures are being checked at home.  Results are 200/100.    Low Salt Diet: no added salt      Amount of exercise or physical activity: None    Problems taking medications regularly: No    Medication side effects: none    Diet: regular (no restrictions)            Problem list and histories reviewed & adjusted, as indicated.  Additional history: as documented    Patient Active Problem List   Diagnosis     Benign essential hypertension     CKD (chronic kidney disease) stage 3, GFR 30-59 ml/min (H)     Obesity (BMI 35.0-39.9) with comorbidity (H)     History reviewed. No pertinent surgical history.    Social History   Substance Use Topics     Smoking status: Never Smoker     Smokeless tobacco: Never Used     Alcohol use No     Family History   Problem Relation Age of Onset     Unknown/Adopted Mother      Unknown/Adopted Father      Diabetes No family hx of      Coronary Artery Disease No family hx of      Hypertension No family hx of      Hyperlipidemia No family hx of      Cerebrovascular Disease No family hx of      Breast Cancer No family hx of      Colon Cancer No family hx of      Prostate Cancer No family hx of      Other Cancer No family hx of      Depression No family hx of      Anxiety Disorder No family hx of      Mental Illness No family hx of      Substance Abuse No family hx of      Anesthesia Reaction No family hx of      Asthma No family hx of      Osteoporosis No family hx of      Genetic Disorder No family hx of      Thyroid Disease No family hx of      Obesity No family hx of       "    Current Outpatient Prescriptions   Medication Sig Dispense Refill     amLODIPine (NORVASC) 10 MG tablet Take 1 tablet (10 mg) by mouth daily 90 tablet 1     aspirin 81 MG tablet Take 81 mg by mouth daily       lisinopril (PRINIVIL/ZESTRIL) 20 MG tablet Take 1 tablet (20 mg) by mouth daily 90 tablet 1     [DISCONTINUED] amLODIPine (NORVASC) 10 MG tablet Take 1 tablet (10 mg) by mouth daily 90 tablet 1     [DISCONTINUED] amLODIPine (NORVASC) 5 MG tablet Take 1 tablet (5 mg) by mouth daily 90 tablet 1     [DISCONTINUED] lisinopril (PRINIVIL/ZESTRIL) 20 MG tablet Take 1 tablet (20 mg) by mouth daily (Patient not taking: Reported on 10/10/2018) 90 tablet 1     [DISCONTINUED] lisinopril (PRINIVIL/ZESTRIL) 20 MG tablet Take 1 tablet (20 mg) by mouth daily 90 tablet 1     [DISCONTINUED] lisinopril (PRINIVIL/ZESTRIL) 20 MG tablet Take 1 tablet (20 mg) by mouth daily 90 tablet 0       Reviewed and updated as needed this visit by clinical staff       Reviewed and updated as needed this visit by Provider         ROS:  CONSTITUTIONAL: NEGATIVE for fever, chills, change in weight  ENT/MOUTH: NEGATIVE for ear, mouth and throat problems  RESP: NEGATIVE for significant cough or SOB  CV: NEGATIVE for chest pain, palpitations or peripheral edema    OBJECTIVE:                                                    BP (!) 170/110  Pulse 120  Temp 98.2  F (36.8  C) (Tympanic)  Ht 5' 8\" (1.727 m)  Wt 234 lb (106.1 kg)  SpO2 98%  BMI 35.58 kg/m2  Body mass index is 35.58 kg/(m^2).   GENERAL: healthy, alert, well nourished, well hydrated, no distress  HENT: ear canals- normal; TMs- normal; Nose- normal; Mouth- no ulcers, no lesions  NECK: no tenderness, no adenopathy, no asymmetry, no masses, no stiffness; thyroid- normal to palpation  RESP: lungs clear to auscultation - no rales, no rhonchi, no wheezes  CV: regular rates and rhythm, normal S1 S2, no S3 or S4 and no murmur, no click or rub -  ABDOMEN: soft, no tenderness, no  " hepatosplenomegaly, no masses, normal bowel sounds         ASSESSMENT/PLAN:                                                        ICD-10-CM    1. Benign essential hypertension I10 TSH     Comprehensive metabolic panel     CBC with platelets     Echocardiogram Complete     amLODIPine (NORVASC) 10 MG tablet     lisinopril (PRINIVIL/ZESTRIL) 20 MG tablet     DISCONTINUED: amLODIPine (NORVASC) 10 MG tablet     DISCONTINUED: lisinopril (PRINIVIL/ZESTRIL) 20 MG tablet   2. Morbid obesity (H) E66.01    3. Hypertensive urgency I16.0 Echocardiogram Complete     Albumin Random Urine Quantitative with Creat Ratio   4. CKD (chronic kidney disease) stage 3, GFR 30-59 ml/min (H) N18.3 Albumin Random Urine Quantitative with Creat Ratio       Patient blood pressure is not well controlled.  Recheck however was slight improved and it was 170/100 to110 diastolic.  He is asymptomatic currently.  I suggested we need to adjust his dose.  I increase his amlodipine from 5 to 10 mg, we will keep lisinopril at 20 mg.  He will follow-up in 2 weeks for recheck.  If blood pressure continue to be suboptimally controlled we discussed about secondary causes including sleep apnea.  . If his blood pressure continue to be elevated despite medical management I would suggest further evaluation.  I will also order an echocardiogram to assess his heart function.  Patient is overall asymptomatic despite his high blood pressure.    Warning signs including chest pain, shortness of breath, any nausea vomiting or any neurological symptoms were discussed in detail.  He will follow-up if any change in symptoms    Shoaib Deras MD  [facility]

## 2018-10-11 LAB
ALBUMIN SERPL-MCNC: 3.9 G/DL (ref 3.4–5)
ALP SERPL-CCNC: 64 U/L (ref 40–150)
ALT SERPL W P-5'-P-CCNC: 33 U/L (ref 0–70)
ANION GAP SERPL CALCULATED.3IONS-SCNC: 9 MMOL/L (ref 3–14)
AST SERPL W P-5'-P-CCNC: 22 U/L (ref 0–45)
BILIRUB SERPL-MCNC: 0.2 MG/DL (ref 0.2–1.3)
BUN SERPL-MCNC: 17 MG/DL (ref 7–30)
CALCIUM SERPL-MCNC: 9.8 MG/DL (ref 8.5–10.1)
CHLORIDE SERPL-SCNC: 102 MMOL/L (ref 94–109)
CO2 SERPL-SCNC: 26 MMOL/L (ref 20–32)
CREAT SERPL-MCNC: 1.41 MG/DL (ref 0.66–1.25)
CREAT UR-MCNC: 57 MG/DL
GFR SERPL CREATININE-BSD FRML MDRD: 49 ML/MIN/1.7M2
GLUCOSE SERPL-MCNC: 110 MG/DL (ref 70–99)
MICROALBUMIN UR-MCNC: 517 MG/L
MICROALBUMIN/CREAT UR: 913.43 MG/G CR (ref 0–17)
POTASSIUM SERPL-SCNC: 4 MMOL/L (ref 3.4–5.3)
PROT SERPL-MCNC: 8.7 G/DL (ref 6.8–8.8)
SODIUM SERPL-SCNC: 137 MMOL/L (ref 133–144)
TSH SERPL DL<=0.005 MIU/L-ACNC: 1.59 MU/L (ref 0.4–4)

## 2018-10-23 ENCOUNTER — HOSPITAL ENCOUNTER (OUTPATIENT)
Dept: CARDIOLOGY | Facility: CLINIC | Age: 73
Discharge: HOME | End: 2018-10-23
Attending: FAMILY MEDICINE | Admitting: FAMILY MEDICINE
Payer: COMMERCIAL

## 2018-10-23 DIAGNOSIS — I16.0 HYPERTENSIVE URGENCY: ICD-10-CM

## 2018-10-23 DIAGNOSIS — I10 BENIGN ESSENTIAL HYPERTENSION: ICD-10-CM

## 2018-10-23 PROCEDURE — 25500064 ZZH RX 255 OP 636: Performed by: FAMILY MEDICINE

## 2018-10-23 PROCEDURE — 93306 TTE W/DOPPLER COMPLETE: CPT | Mod: 26 | Performed by: INTERNAL MEDICINE

## 2018-10-23 PROCEDURE — 40000264 ECHO COMPLETE WITH OPTISON

## 2018-10-23 RX ADMIN — HUMAN ALBUMIN MICROSPHERES AND PERFLUTREN 7 ML: 10; .22 INJECTION, SOLUTION INTRAVENOUS at 14:26

## 2018-10-24 ENCOUNTER — OFFICE VISIT (OUTPATIENT)
Dept: FAMILY MEDICINE | Facility: CLINIC | Age: 73
End: 2018-10-24
Payer: COMMERCIAL

## 2018-10-24 VITALS
TEMPERATURE: 97.8 F | SYSTOLIC BLOOD PRESSURE: 154 MMHG | DIASTOLIC BLOOD PRESSURE: 100 MMHG | WEIGHT: 227 LBS | HEART RATE: 122 BPM | BODY MASS INDEX: 34.52 KG/M2 | OXYGEN SATURATION: 99 %

## 2018-10-24 DIAGNOSIS — E66.01 MORBID OBESITY (H): ICD-10-CM

## 2018-10-24 DIAGNOSIS — Z12.11 SCREEN FOR COLON CANCER: ICD-10-CM

## 2018-10-24 DIAGNOSIS — E78.5 HYPERLIPIDEMIA LDL GOAL <130: ICD-10-CM

## 2018-10-24 DIAGNOSIS — Z23 NEED FOR PROPHYLACTIC VACCINATION AND INOCULATION AGAINST INFLUENZA: ICD-10-CM

## 2018-10-24 DIAGNOSIS — N18.30 CKD (CHRONIC KIDNEY DISEASE) STAGE 3, GFR 30-59 ML/MIN (H): Primary | ICD-10-CM

## 2018-10-24 DIAGNOSIS — I10 BENIGN ESSENTIAL HYPERTENSION: ICD-10-CM

## 2018-10-24 PROCEDURE — 80061 LIPID PANEL: CPT | Performed by: FAMILY MEDICINE

## 2018-10-24 PROCEDURE — 90471 IMMUNIZATION ADMIN: CPT | Performed by: FAMILY MEDICINE

## 2018-10-24 PROCEDURE — 90662 IIV NO PRSV INCREASED AG IM: CPT | Performed by: FAMILY MEDICINE

## 2018-10-24 PROCEDURE — 99214 OFFICE O/P EST MOD 30 MIN: CPT | Mod: 25 | Performed by: FAMILY MEDICINE

## 2018-10-24 PROCEDURE — 36415 COLL VENOUS BLD VENIPUNCTURE: CPT | Performed by: FAMILY MEDICINE

## 2018-10-24 RX ORDER — CARVEDILOL 6.25 MG/1
6.25 TABLET ORAL 2 TIMES DAILY WITH MEALS
Qty: 180 TABLET | Refills: 1 | Status: SHIPPED | OUTPATIENT
Start: 2018-10-24 | End: 2018-12-19

## 2018-10-24 NOTE — PROGRESS NOTES
SUBJECTIVE:   Bharat Bain is a 73 year old male who presents to clinic today for the following health issues:      Hypertension Follow-up  Patient is taking amlodipine 10 mg along with lisinopril 20 mg.  His blood pressure in the clinic has always been elevated.  He has recent echocardiogram done which indicate no concentric hypertrophy .  Ejection fraction is preserved.  Grade I diastolic heart function.  Patient also had lab work done which indicate kidney function which are mildly elevated along with low GFR.  Patient is also noted to have albumin urea which is slight high.  He denies any swelling.  No chest pains no shortness of breath    Outpatient blood pressures are being checked at home 4-5 times per week.  Results are 130-140/80-90.    Low Salt Diet: low salt      Amount of exercise or physical activity: almost daily    Problems taking medications regularly: No    Medication side effects: none    Diet: low salt            Problem list and histories reviewed & adjusted, as indicated.  Additional history: as documented    Patient Active Problem List   Diagnosis     Benign essential hypertension     CKD (chronic kidney disease) stage 3, GFR 30-59 ml/min (H)     Hyperlipidemia LDL goal <130     No past surgical history on file.    Social History   Substance Use Topics     Smoking status: Never Smoker     Smokeless tobacco: Never Used     Alcohol use No     Family History   Problem Relation Age of Onset     Unknown/Adopted Mother      Unknown/Adopted Father      Diabetes No family hx of      Coronary Artery Disease No family hx of      Hypertension No family hx of      Hyperlipidemia No family hx of      Cerebrovascular Disease No family hx of      Breast Cancer No family hx of      Colon Cancer No family hx of      Prostate Cancer No family hx of      Other Cancer No family hx of      Depression No family hx of      Anxiety Disorder No family hx of      Mental Illness No family hx of      Substance Abuse  No family hx of      Anesthesia Reaction No family hx of      Asthma No family hx of      Osteoporosis No family hx of      Genetic Disorder No family hx of      Thyroid Disease No family hx of      Obesity No family hx of          Current Outpatient Prescriptions   Medication Sig Dispense Refill     amLODIPine (NORVASC) 10 MG tablet Take 1 tablet (10 mg) by mouth daily 90 tablet 1     aspirin 81 MG tablet Take 81 mg by mouth daily       carvedilol (COREG) 6.25 MG tablet Take 1 tablet (6.25 mg) by mouth 2 times daily (with meals) 180 tablet 1     lisinopril (PRINIVIL/ZESTRIL) 20 MG tablet Take 1 tablet (20 mg) by mouth daily 90 tablet 1       Reviewed and updated as needed this visit by clinical staff  Tobacco  Allergies  Meds       Reviewed and updated as needed this visit by Provider         ROS:  CONSTITUTIONAL: NEGATIVE for fever, chills, change in weight  ENT/MOUTH: NEGATIVE for ear, mouth and throat problems  RESP: NEGATIVE for significant cough or SOB  CV: NEGATIVE for chest pain, palpitations or peripheral edema    OBJECTIVE:                                                    BP (!) 154/100  Pulse 122  Temp 97.8  F (36.6  C) (Tympanic)  Wt 227 lb (103 kg)  SpO2 99%  BMI 34.52 kg/m2  Body mass index is 34.52 kg/(m^2).   GENERAL: healthy, alert, well nourished, well hydrated, no distress  HENT: ear canals- normal; TMs- normal; Nose- normal; Mouth- no ulcers, no lesions  NECK: no tenderness, no adenopathy, no asymmetry, no masses, no stiffness; thyroid- normal to palpation  RESP: lungs clear to auscultation - no rales, no rhonchi, no wheezes  CV: regular rates and rhythm, normal S1 S2, no S3 or S4 and no murmur, no click or rub -    No leg swelling     ASSESSMENT/PLAN:                                                        ICD-10-CM    1. CKD (chronic kidney disease) stage 3, GFR 30-59 ml/min (H) N18.3 NEPHROLOGY ADULT REFERRAL   2. Screen for colon cancer Z12.11 GASTROENTEROLOGY ADULT REF PROCEDURE  ONLY [facility] [phone]   3. Need for prophylactic vaccination and inoculation against influenza Z23 FLU VACCINE, INCREASED ANTIGEN, PRESV FREE, AGE 65+ [38270]   4. Benign essential hypertension I10 NEPHROLOGY ADULT REFERRAL     carvedilol (COREG) 6.25 MG tablet   5. Morbid obesity (H) E66.01 Lipid panel reflex to direct LDL Fasting   6. Hyperlipidemia LDL goal <130 E78.5        Patient labs reviewed along with echocardiogram.  His blood pressure is still on the upper side.  He is currently on amlodipine 10 mg and 20 mg lisinopril.  I would add small dose of Coreg to see if that helps.  Patient is advised to see a nephrologist for possible kidney issues which are contributing to his high blood pressure.  Advised low-salt diet regular exercise.  Warning signs of new medication adverse effects were discussed with the patient.  Follow-up in 1-2 months for recheck.    Shoaib Deras MD  [facility]

## 2018-10-24 NOTE — LETTER
October 25, 2018      Bharat Bain  [address] [place] [address]        Dear ,    We are writing to inform you of your test results.    Your test results fall within the expected range(s) or remain unchanged from previous results.  Plan to recheck in one year.    Resulted Orders   Lipid panel reflex to direct LDL Fasting   Result Value Ref Range    Cholesterol 139 <200 mg/dL    Triglycerides 105 <150 mg/dL      Comment:      Non Fasting    HDL Cholesterol 46 >39 mg/dL    LDL Cholesterol Calculated 72 <100 mg/dL      Comment:      Desirable:       <100 mg/dl    Non HDL Cholesterol 93 <130 mg/dL       If you have any questions or concerns, please call the clinic at the number listed above.       Sincerely,    Shoaib Deras MD

## 2018-10-24 NOTE — MR AVS SNAPSHOT
After Visit Summary   10/24/2018    Bharat Bain    MRN: [id number]           Patient Information     Date Of Birth          1945        Visit Information        Provider Department      10/24/2018 10:00 AM Shoaib Deras MD [facility]        Today's Diagnoses     CKD (chronic kidney disease) stage 3, GFR 30-59 ml/min (H)    -  1    Screen for colon cancer        Need for prophylactic vaccination and inoculation against influenza        Benign essential hypertension        Morbid obesity (H)        Hyperlipidemia LDL goal <130           Follow-ups after your visit        Additional Services     GASTROENTEROLOGY ADULT REF PROCEDURE ONLY [facility] [phone]       Last Lab Result: Creatinine (mg/dL)       Date                     Value                 10/10/2018               1.41 (H)         ----------  Body mass index is 34.52 kg/(m^2).     Needed:  No  Language:  English    Patient will be contacted to schedule procedure.     Please be aware that coverage of these services is subject to the terms and limitations of your health insurance plan.  Call member services at your health plan with any benefit or coverage questions.  Any procedures must be performed at a [facility] facility OR coordinated by your clinic's referral office.    Please bring the following with you to your appointment:    (1) Any X-Rays, CTs or MRIs which have been performed.  Contact the facility where they were done to arrange for  prior to your scheduled appointment.    (2) List of current medications   (3) This referral request   (4) Any documents/labs given to you for this referral            NEPHROLOGY ADULT REFERRAL       Your provider has referred you to: [facility] [phone]   [email or web address]    Please be aware that coverage of these services is subject to the terms and limitations of your health insurance plan.  Call  "member services at your health plan with any benefit or coverage questions.      Reason for referral:  [place] eGFR < 60 ml/min AND blood presure persistently > 130/80 despite antihypertensive management.   Please bring the following to your appointment:    >>   Any x-rays, CTs or MRIs which have been performed.  Contact the facility where they were done to arrange for  prior to your scheduled appointment.  Any new CT, MRI or other procedures ordered by your specialist must be performed at a [facility] facility or coordinated by your clinic's referral office.    >>   List of current medications   >>   This referral request   >>   Any documents/labs given to you for this referral                  Future tests that were ordered for you today     Open Future Orders        Priority Expected Expires Ordered    ECHO COMPLETE WITH Make YES! Happen Routine  10/10/2019 10/10/2018            Who to contact     If you have questions or need follow up information about today's clinic visit or your schedule please contact [facility] [place] directly at [phone].  Normal or non-critical lab and imaging results will be communicated to you by MyChart, letter or phone within 4 business days after the clinic has received the results. If you do not hear from us within 7 days, please contact the clinic through [facility] or phone. If you have a critical or abnormal lab result, we will notify you by phone as soon as possible.  Submit refill requests through Join [facility] Wellness Team or call your pharmacy and they will forward the refill request to us. Please allow 3 business days for your refill to be completed.          Additional Information About Your Visit        Join The Wellness Team Information     Join The Wellness Team lets you send messages to your doctor, view your test results, renew your prescriptions, schedule appointments and more. To sign up, go to [email or web address] . Click on \"Log in\" on the left side of the screen, which will take you to the Welcome " "page. Then click on \"Sign up Now\" on the right side of the page.     You will be asked to enter the access code listed below, as well as some personal information. Please follow the directions to create your username and password.     Your access code is: [email or web address]  Expires: 2019  6:38 PM     Your access code will  in 90 days. If you need help or a new code, please call your [facility] clinic or [phone].        Care EveryWhere ID     This is your Care EveryWhere ID. This could be used by other organizations to access your [facility] medical records  [id number]        Your Vitals Were     Pulse Temperature Pulse Oximetry BMI (Body Mass Index)          122 97.8  F (36.6  C) (Tympanic) 99% 34.52 kg/m2         Blood Pressure from Last 3 Encounters:   10/24/18 (!) 154/100   10/10/18 (!) 170/110   18 138/80    Weight from Last 3 Encounters:   10/24/18 227 lb (103 kg)   10/10/18 234 lb (106.1 kg)   18 227 lb (103 kg)              We Performed the Following     FLU VACCINE, INCREASED ANTIGEN, PRESV FREE, AGE 65+ [02713]     GASTROENTEROLOGY ADULT REF PROCEDURE ONLY [facility]aud[facility] [phone]     Lipid panel reflex to direct LDL Fasting     NEPHROLOGY ADULT REFERRAL          Today's Medication Changes          These changes are accurate as of 10/24/18 10:31 AM.  If you have any questions, ask your nurse or doctor.               Start taking these medicines.        Dose/Directions    carvedilol 6.25 MG tablet   Commonly known as:  COREG   Used for:  Benign essential hypertension   Started by:  Shoaib Deras MD        Dose:  6.25 mg   Take 1 tablet (6.25 mg) by mouth 2 times daily (with meals)   Quantity:  180 tablet   Refills:  1            Where to get your medicines      These medications were sent to [facility] [place] - [address]  [address], [place] [address]     Phone:  [phone]     carvedilol 6.25 MG tablet             "    Primary Care Provider Office Phone # Fax #    Shoaib Deras [phone] [address]  [place] [address]        Equal Access to Services     OSITO YADAV : Leland mccollum gerdao Sotreyali, waaxda luqadaha, qaybta kaalmada adeegyada, andrés nagyn reny seayjaime farnsworth. So [facility] [phone].    ATENCIÓN: Si habla español, tiene a elise disposición servicios gratuitos de asistencia lingüística. Llame al [phone].    We comply with applicable federal civil rights laws and [place] laws. We do not discriminate on the basis of race, color, national origin, age, disability, sex, sexual orientation, or gender identity.            Thank you!     Thank you for choosing [facility] [place]  for your care. Our goal is always to provide you with excellent care. Hearing back from our patients is one way we can continue to improve our services. Please take a few minutes to complete the written survey that you may receive in the mail after your visit with us. Thank you!             Your Updated Medication List - Protect others around you: Learn how to safely use, store and throw away your medicines at www.disposemymeds.org.          This list is accurate as of 10/24/18 10:31 AM.  Always use your most recent med list.                   Brand Name Dispense Instructions for use Diagnosis    amLODIPine 10 MG tablet    NORVASC    90 tablet    Take 1 tablet (10 mg) by mouth daily    Benign essential hypertension       aspirin 81 MG tablet      Take 81 mg by mouth daily        carvedilol 6.25 MG tablet    COREG    180 tablet    Take 1 tablet (6.25 mg) by mouth 2 times daily (with meals)    Benign essential hypertension       lisinopril 20 MG tablet    PRINIVIL/ZESTRIL    90 tablet    Take 1 tablet (20 mg) by mouth daily    Benign essential hypertension

## 2018-10-25 LAB
CHOLEST SERPL-MCNC: 139 MG/DL
HDLC SERPL-MCNC: 46 MG/DL
LDLC SERPL CALC-MCNC: 72 MG/DL
NONHDLC SERPL-MCNC: 93 MG/DL
TRIGL SERPL-MCNC: 105 MG/DL

## 2018-11-13 ENCOUNTER — TRANSFERRED RECORDS (OUTPATIENT)
Dept: HEALTH INFORMATION MANAGEMENT | Facility: CLINIC | Age: 73
End: 2018-11-13

## 2018-11-14 ENCOUNTER — HOSPITAL ENCOUNTER (OUTPATIENT)
Facility: CLINIC | Age: 73
Discharge: HOME | End: 2018-11-14
Attending: COLON & RECTAL SURGERY | Admitting: COLON & RECTAL SURGERY
Payer: COMMERCIAL

## 2018-11-14 ENCOUNTER — SURGERY (OUTPATIENT)
Age: 73
End: 2018-11-14

## 2018-11-14 VITALS
BODY MASS INDEX: 34.4 KG/M2 | HEIGHT: 68 IN | OXYGEN SATURATION: 99 % | SYSTOLIC BLOOD PRESSURE: 163 MMHG | WEIGHT: 227 LBS | DIASTOLIC BLOOD PRESSURE: 113 MMHG | RESPIRATION RATE: 13 BRPM

## 2018-11-14 LAB — COLONOSCOPY: NORMAL

## 2018-11-14 PROCEDURE — 88305 TISSUE EXAM BY PATHOLOGIST: CPT | Performed by: COLON & RECTAL SURGERY

## 2018-11-14 PROCEDURE — G0500 MOD SEDAT ENDO SERVICE >5YRS: HCPCS | Performed by: COLON & RECTAL SURGERY

## 2018-11-14 PROCEDURE — 25000128 H RX IP 250 OP 636: Performed by: COLON & RECTAL SURGERY

## 2018-11-14 PROCEDURE — 88305 TISSUE EXAM BY PATHOLOGIST: CPT | Mod: 26 | Performed by: COLON & RECTAL SURGERY

## 2018-11-14 PROCEDURE — 45380 COLONOSCOPY AND BIOPSY: CPT | Mod: PT | Performed by: COLON & RECTAL SURGERY

## 2018-11-14 RX ORDER — FENTANYL CITRATE 50 UG/ML
INJECTION, SOLUTION INTRAMUSCULAR; INTRAVENOUS PRN
Status: DISCONTINUED | OUTPATIENT
Start: 2018-11-14 | End: 2018-11-14 | Stop reason: HOSPADM

## 2018-11-14 RX ORDER — LIDOCAINE 40 MG/G
CREAM TOPICAL
Status: DISCONTINUED | OUTPATIENT
Start: 2018-11-14 | End: 2018-11-14 | Stop reason: HOSPADM

## 2018-11-14 RX ORDER — ONDANSETRON 2 MG/ML
4 INJECTION INTRAMUSCULAR; INTRAVENOUS
Status: DISCONTINUED | OUTPATIENT
Start: 2018-11-14 | End: 2018-11-14 | Stop reason: HOSPADM

## 2018-11-14 RX ADMIN — FENTANYL CITRATE 50 MCG: 50 INJECTION, SOLUTION INTRAMUSCULAR; INTRAVENOUS at 11:37

## 2018-11-14 RX ADMIN — MIDAZOLAM 1 MG: 1 INJECTION INTRAMUSCULAR; INTRAVENOUS at 11:37

## 2018-11-14 NOTE — H&P
Colon & Rectal Surgery History and Physical  Pre-Endoscopy Procedure Note    History of Present Illness   I have been asked by Dr. Deras to evaluate this 73 year old male for colorectal cancer screening. He currently denies any abdominal pain, weight loss, bleeding per rectum, or recent change in bowel habits.    Past Medical History   Benign essential hypertension   Chronic kidney disease    Past Surgical History   No pertinent surgical history    Medications  Medication Sig     amLODIPine (NORVASC) 10 MG tablet Take 1 tablet (10 mg) by mouth daily     aspirin 81 MG tablet Take 81 mg by mouth daily     carvedilol (COREG) 6.25 MG tablet Take 1 tablet (6.25 mg) by mouth 2 times daily (with meals)     lisinopril (PRINIVIL/ZESTRIL) 20 MG tablet Take 1 tablet (20 mg) by mouth daily       Allergies   No Known Allergies     Family History   Family history is unknown, as patient is dopted in his father and mother.     Social History    He reports that he has never smoked. He has never used smokeless tobacco. He reports that he does not drink alcohol or use illicit drugs.    Review of Systems   Constitutional:  No fever, weight change or fatigue.    Eyes:     No dry eyes or vision changes.   Ears/Nose/Throat/Neck:  No oral ulcers, sore throat or voice change.    Cardiovascular:   No palpitations, syncope, angina or edema.   Respiratory:    No chest pain, excessive sleepiness, shortness of breath or hemoptysis.    Gastrointestinal:   No abdominal pain, nausea, vomiting, diarrhea or heartburn.    Genitourinary:   No dysuria, hematuria, urinary retention or urinary frequency.   Musculoskeletal:  No joint swelling or arthralgias.    Dermatologic:  No skin rash or other skin changes.   Neurologic:    No focal weakness or numbness. No neuropathy.   Psychiatric:    No depression, anxiety, suicidal ideation, or paranoid ideation.   Endocrine:   No cold or heat intolerance, polydipsia, hirsutism, change in libido, or  "flushing.   Hematology/Lymphatic:  No bleeding or lymphadenopathy.    Allergy/Immunology:  No rhinitis or hives.     Physical Exam   Vitals:  BP (!) 180/102, HR 64, RR 12, height 1.727 m (5' 8\"), weight 103 kg (227 lb).    General:  Alert and oriented to person, place and time   Airway: Normal oropharyngeal airway and neck mobility   Lungs:  Clear bilaterally   Heart:  Regular rate and rhythm   Abdomen: Soft, NT, ND, no masses   Rectal:  Perianal skin without excoriation, hemorrhoidal disease or anal fissure        Digital rectal examination reveals normal sphincter tone without masses    ASA Grade: II (mild systemic disease)    Impression: Cleared for use of conscious sedation for colorectal cancer screening    Plan: Proceed with colonoscopy     Azalea Byrne MD  [facility]  [phone]  "

## 2018-11-15 LAB — COPATH REPORT: NORMAL

## 2018-12-14 ENCOUNTER — TRANSFERRED RECORDS (OUTPATIENT)
Dept: HEALTH INFORMATION MANAGEMENT | Facility: CLINIC | Age: 73
End: 2018-12-14

## 2018-12-19 ENCOUNTER — OFFICE VISIT (OUTPATIENT)
Dept: FAMILY MEDICINE | Facility: CLINIC | Age: 73
End: 2018-12-19
Payer: COMMERCIAL

## 2018-12-19 VITALS
HEART RATE: 88 BPM | SYSTOLIC BLOOD PRESSURE: 154 MMHG | TEMPERATURE: 98.1 F | DIASTOLIC BLOOD PRESSURE: 92 MMHG | BODY MASS INDEX: 34.21 KG/M2 | WEIGHT: 225 LBS

## 2018-12-19 DIAGNOSIS — I10 BENIGN ESSENTIAL HYPERTENSION: ICD-10-CM

## 2018-12-19 PROCEDURE — 99214 OFFICE O/P EST MOD 30 MIN: CPT | Performed by: FAMILY MEDICINE

## 2018-12-19 RX ORDER — AMLODIPINE BESYLATE 10 MG/1
10 TABLET ORAL DAILY
Qty: 90 TABLET | Refills: 1 | Status: SHIPPED | OUTPATIENT
Start: 2018-12-19 | End: 2019-06-21

## 2018-12-19 RX ORDER — ACETAMINOPHEN 160 MG
TABLET,DISINTEGRATING ORAL DAILY
COMMUNITY
Start: 2018-12-14 | End: 2019-07-26

## 2018-12-19 RX ORDER — CARVEDILOL 12.5 MG/1
12.5 TABLET ORAL 2 TIMES DAILY WITH MEALS
COMMUNITY
Start: 2018-12-15 | End: 2019-07-26

## 2018-12-19 RX ORDER — LISINOPRIL 20 MG/1
20 TABLET ORAL DAILY
Qty: 90 TABLET | Refills: 1 | Status: SHIPPED | OUTPATIENT
Start: 2018-12-19 | End: 2019-07-26

## 2018-12-19 RX ORDER — AMLODIPINE BESYLATE 5 MG/1
1 TABLET ORAL DAILY
COMMUNITY
Start: 2018-11-26 | End: 2018-12-19

## 2018-12-19 NOTE — PROGRESS NOTES
SUBJECTIVE:   Bharat Bain is a 73 year old male who presents to clinic today for the following health issues:      Hypertension Follow-up      Outpatient blood pressures are being checked at home.  Results are 150s-160s/80-90.    Low Salt Diet: no added salt      Patient has seen nephrology and his carvedilol dose was increased from 6.25 to 12.5 twice daily.  Apart from that there was little confusion if he is taking 5 mg of amlodipine versus 10 mg.  He is also taking lisinopril.  Denies any current symptoms denies any chest pain shortness of breath.  Eating healthy        Problem list and histories reviewed & adjusted, as indicated.  Additional history: as documented    Patient Active Problem List   Diagnosis     Benign essential hypertension     CKD (chronic kidney disease) stage 3, GFR 30-59 ml/min (H)     Hyperlipidemia LDL goal <130     Past Surgical History:   Procedure Laterality Date     COLONOSCOPY N/A 11/14/2018    Procedure: COMBINED COLONOSCOPY, SINGLE OR MULTIPLE BIOPSY/POLYPECTOMY BY BIOPSY;  Surgeon: Azalea Byrne MD;  Location:  GI       Social History     Tobacco Use     Smoking status: Never Smoker     Smokeless tobacco: Never Used   Substance Use Topics     Alcohol use: No     Family History   Problem Relation Age of Onset     Unknown/Adopted Mother      Unknown/Adopted Father      Diabetes No family hx of      Coronary Artery Disease No family hx of      Hypertension No family hx of      Hyperlipidemia No family hx of      Cerebrovascular Disease No family hx of      Breast Cancer No family hx of      Colon Cancer No family hx of      Prostate Cancer No family hx of      Other Cancer No family hx of      Depression No family hx of      Anxiety Disorder No family hx of      Mental Illness No family hx of      Substance Abuse No family hx of      Anesthesia Reaction No family hx of      Asthma No family hx of      Osteoporosis No family hx of      Genetic Disorder No family hx of       Thyroid Disease No family hx of      Obesity No family hx of          Current Outpatient Medications   Medication Sig Dispense Refill     amLODIPine (NORVASC) 10 MG tablet Take 1 tablet (10 mg) by mouth daily 90 tablet 1     aspirin 81 MG tablet Take 81 mg by mouth daily       carvedilol (COREG) 12.5 MG tablet 12.5 mg 2 times daily (with meals)        Cholecalciferol (VITAMIN D3) 2000 units CAPS daily       lisinopril (PRINIVIL/ZESTRIL) 20 MG tablet Take 1 tablet (20 mg) by mouth daily 90 tablet 1       Reviewed and updated as needed this visit by clinical staff  Tobacco  Meds       Reviewed and updated as needed this visit by Provider         ROS:  CONSTITUTIONAL: NEGATIVE for fever, chills, change in weight  ENT/MOUTH: NEGATIVE for ear, mouth and throat problems  RESP: NEGATIVE for significant cough or SOB  CV: NEGATIVE for chest pain, palpitations or peripheral edema    OBJECTIVE:                                                    BP (!) 154/92   Pulse 88   Temp 98.1  F (36.7  C) (Tympanic)   Wt 102.1 kg (225 lb)   BMI 34.21 kg/m    Body mass index is 34.21 kg/m .   GENERAL: healthy, alert, well nourished, well hydrated, no distress  HENT: ear canals- normal; TMs- normal; Nose- normal; Mouth- no ulcers, no lesions  NECK: no tenderness, no adenopathy, no asymmetry, no masses, no stiffness; thyroid- normal to palpation  RESP: lungs clear to auscultation - no rales, no rhonchi, no wheezes  CV: regular rates and rhythm, normal S1 S2, no S3 or S4 and no murmur, no click or rub -  ABDOMEN: soft, no tenderness, no  hepatosplenomegaly, no masses, normal bowel sounds         ASSESSMENT/PLAN:                                                        ICD-10-CM    1. Benign essential hypertension I10 lisinopril (PRINIVIL/ZESTRIL) 20 MG tablet     amLODIPine (NORVASC) 10 MG tablet     Patient overall feels healthy.  Eating healthy.  His blood pressure is still elevated but slight better from his previous reading.  I  suggested patient to be compliant with his medication.  Will call pharmacy and check what type of medication he has.  I advised he should continue on 20 mg of lisinopril and carvedilol as suggested by the nephrologist.  Meanwhile I suggested he should take 10 mg of amlodipine if tolerated.  Warning signs were discussed with the patient.  Follow-up in 3 months for recheck.  Updated medications are sent to the pharmacy.    hSoaib Deras MD  [facility] [place]

## 2019-05-06 ENCOUNTER — TRANSFERRED RECORDS (OUTPATIENT)
Dept: HEALTH INFORMATION MANAGEMENT | Facility: CLINIC | Age: 74
End: 2019-05-06

## 2019-07-26 ENCOUNTER — OFFICE VISIT (OUTPATIENT)
Dept: FAMILY MEDICINE | Facility: CLINIC | Age: 74
End: 2019-07-26
Payer: COMMERCIAL

## 2019-07-26 VITALS
HEIGHT: 68 IN | TEMPERATURE: 98.8 F | SYSTOLIC BLOOD PRESSURE: 150 MMHG | DIASTOLIC BLOOD PRESSURE: 98 MMHG | OXYGEN SATURATION: 98 % | WEIGHT: 233 LBS | BODY MASS INDEX: 35.31 KG/M2 | HEART RATE: 82 BPM

## 2019-07-26 DIAGNOSIS — E66.01 MORBID OBESITY (H): ICD-10-CM

## 2019-07-26 DIAGNOSIS — N18.30 CKD (CHRONIC KIDNEY DISEASE) STAGE 3, GFR 30-59 ML/MIN (H): ICD-10-CM

## 2019-07-26 DIAGNOSIS — E55.9 VITAMIN D DEFICIENCY: ICD-10-CM

## 2019-07-26 DIAGNOSIS — I10 BENIGN ESSENTIAL HYPERTENSION: Primary | ICD-10-CM

## 2019-07-26 PROCEDURE — 99214 OFFICE O/P EST MOD 30 MIN: CPT | Performed by: NURSE PRACTITIONER

## 2019-07-26 RX ORDER — CARVEDILOL 12.5 MG/1
12.5 TABLET ORAL 2 TIMES DAILY WITH MEALS
Qty: 60 TABLET | Refills: 5 | Status: SHIPPED | OUTPATIENT
Start: 2019-07-26 | End: 2020-02-17

## 2019-07-26 RX ORDER — LISINOPRIL 40 MG/1
40 TABLET ORAL DAILY
Qty: 30 TABLET | Refills: 5 | Status: SHIPPED | OUTPATIENT
Start: 2019-07-26 | End: 2020-08-17

## 2019-07-26 RX ORDER — CHOLECALCIFEROL (VITAMIN D3) 50 MCG
1 TABLET ORAL DAILY
Qty: 90 TABLET | Refills: 3 | Status: SHIPPED | OUTPATIENT
Start: 2019-07-26 | End: 2020-11-02

## 2019-07-26 RX ORDER — AMLODIPINE BESYLATE 10 MG/1
10 TABLET ORAL DAILY
Qty: 30 TABLET | Refills: 5 | Status: SHIPPED | OUTPATIENT
Start: 2019-07-26 | End: 2020-03-15

## 2019-07-26 ASSESSMENT — MIFFLIN-ST. JEOR: SCORE: 1781.38

## 2019-07-26 NOTE — PROGRESS NOTES
Subjective     Bharat Bain is a 74 year old male who presents to clinic today for the following health issues:    HPI   Hypertension Follow-up      Do you check your blood pressure regularly outside of the clinic? Yes 140's/80-90's.      Are you following a low salt diet? Yes    Are your blood pressures ever more than 140 on the top number (systolic) OR more   than 90 on the bottom number (diastolic), for example 140/90? Yes - sometimes    Amount of exercise or physical activity: 6-7 days/week for an average of 30-60 minutes    Problems taking medications regularly: No    Ran out of medication 1 week ago.      Medication side effects: none    Diet: low salt      Was seen by a kidney specialist 3 months ago.  [facility] in [place], [place].        Previously lived in [place] and recently moved to [place].    Lives with wife.  Is originally from [facility].  Has been here for almost 3 years.    5 living children.  4 children live here in [place].  1 other son lives in [facility].      Son (Estella) lives in [place] and helps with appointments.          Patient Active Problem List   Diagnosis     Benign essential hypertension     CKD (chronic kidney disease) stage 3, GFR 30-59 ml/min (H)     Hyperlipidemia LDL goal <130     Obesity (BMI 35.0-39.9) with comorbidity (H)     Past Surgical History:   Procedure Laterality Date     COLONOSCOPY N/A 11/14/2018    Procedure: COMBINED COLONOSCOPY, SINGLE OR MULTIPLE BIOPSY/POLYPECTOMY BY BIOPSY;  Surgeon: Azalea Byrne MD;  Location:  GI       Social History     Tobacco Use     Smoking status: Never Smoker     Smokeless tobacco: Never Used   Substance Use Topics     Alcohol use: No     Family History   Problem Relation Age of Onset     Unknown/Adopted Mother      Unknown/Adopted Father      Diabetes No family hx of      Coronary Artery Disease No family hx of      Hypertension No family hx of      Hyperlipidemia No family hx of      Cerebrovascular  "Disease No family hx of      Breast Cancer No family hx of      Colon Cancer No family hx of      Prostate Cancer No family hx of      Other Cancer No family hx of      Depression No family hx of      Anxiety Disorder No family hx of      Mental Illness No family hx of      Substance Abuse No family hx of      Anesthesia Reaction No family hx of      Asthma No family hx of      Osteoporosis No family hx of      Genetic Disorder No family hx of      Thyroid Disease No family hx of      Obesity No family hx of          Current Outpatient Medications   Medication Sig Dispense Refill     amLODIPine (NORVASC) 10 MG tablet Take 1 tablet (10 mg) by mouth daily 30 tablet 5     aspirin 81 MG tablet Take 81 mg by mouth daily       carvedilol (COREG) 12.5 MG tablet Take 1 tablet (12.5 mg) by mouth 2 times daily (with meals) 60 tablet 5     lisinopril (PRINIVIL/ZESTRIL) 40 MG tablet Take 1 tablet (40 mg) by mouth daily 30 tablet 5     vitamin D3 (CHOLECALCIFEROL) 2000 units (50 mcg) tablet Take 1 tablet (2,000 Units) by mouth daily 90 tablet 3     Allergies   Allergen Reactions     Pork Derived Products      Does not eat pork     Reviewed and updated as needed this visit by Provider         Review of Systems   ROS COMP: Constitutional, HEENT, cardiovascular, pulmonary, gi and gu systems are negative, except as otherwise noted.      Objective    BP (!) 150/98   Pulse 82   Temp 98.8  F (37.1  C) (Oral)   Ht 1.727 m (5' 8\")   Wt 105.7 kg (233 lb)   SpO2 98%   BMI 35.43 kg/m    Body mass index is 35.43 kg/m .  Physical Exam   GENERAL: healthy, alert and no distress  RESP: lungs clear to auscultation - no rales, rhonchi or wheezes  CV: regular rate and rhythm, normal S1 S2, no S3 or S4, no murmur, click or rub, no peripheral edema  NEURO: Normal strength and tone, mentation intact and speech normal  PSYCH: mentation appears normal, affect normal/bright        Assessment & Plan     Bharat was seen today for " hypertension.    Diagnoses and all orders for this visit:    Benign essential hypertension  Currently uncontrolled due to being off medications for the past 1 week.    -     amLODIPine (NORVASC) 10 MG tablet; Take 1 tablet (10 mg) by mouth daily  -     lisinopril (PRINIVIL/ZESTRIL) 40 MG tablet; Take 1 tablet (40 mg) by mouth daily  -     carvedilol (COREG) 12.5 MG tablet; Take 1 tablet (12.5 mg) by mouth 2 times daily (with meals)    CKD (chronic kidney disease) stage 3, GFR 30-59 ml/min (H)  Followed by kidney specialist.      Morbid obesity (H)  Co-morbid conditions:  HTN.      Vitamin D deficiency  -     vitamin D3 (CHOLECALCIFEROL) 2000 units (50 mcg) tablet; Take 1 tablet (2,000 Units) by mouth daily        Return in about 1 month (around 8/26/2019) for BP Recheck.   Bring in ALL medications to follow-up appointment.        MIHAELA Clark [facility]

## 2020-01-20 ENCOUNTER — TELEPHONE (OUTPATIENT)
Dept: FAMILY MEDICINE | Facility: CLINIC | Age: 75
End: 2020-01-20

## 2020-01-20 NOTE — TELEPHONE ENCOUNTER
Needs of attention regarding:  -Multiple     Health Maintenance Topics with due status: Overdue       Topic Date Due    ADVANCE CARE PLANNING 1945    DTAP/TDAP/TD IMMUNIZATION 02/28/1958    ZOSTER IMMUNIZATION 02/28/1997    MEDICARE ANNUAL WELLNESS VISIT 02/28/2012    PNEUMOCOCCAL IMMUNIZATION 65+ LOW/MEDIUM RISK 02/28/2012    AORTIC ANEURYSM SCREENING (SYSTEM ASSIGNED) 02/28/2012    FALL RISK ASSESSMENT 03/17/2018    INFLUENZA VACCINE 09/01/2019    BMP 10/10/2019    MICROALBUMIN 10/10/2019    LIPID 10/24/2019    PHQ-2 01/01/2020       Communication:  See Letter

## 2020-01-20 NOTE — LETTER
January 20, 2020      Bharat Bain  [address]  [id number]  [facility] [address]        Dear Bharat,    I care about your health and have reviewed your health plan. I have reviewed your medical conditions, medication list, and lab results and am making recommendations based on this review, to better manage your health.    You are in particular need of attention regarding:  -Wellness (Physical) Visit     I am recommending that you:  -schedule a WELLNESS (Physical) APPOINTMENT with me.   I will check fasting labs the same day - nothing to eat except water and meds for 8-10 hours prior.    Here is a list of Health Maintenance topics that are due now or due soon:  Health Maintenance Due   Topic Date Due     Discuss Advance Care Planning  1945     Diptheria Tetanus Pertussis (DTAP/TDAP/TD) Vaccine (1 - Tdap) 02/28/1958     Zoster (Shingles) Vaccine (1 of 2) 02/28/1997     Annual Wellness Visit  02/28/2012     Pneumococcal Vaccine (1 of 2 - PCV13) 02/28/2012     AORTIC ANEURYSM SCREENING (SYSTEM ASSIGNED)  02/28/2012     FALL RISK ASSESSMENT  03/17/2018     Flu Vaccine (1) 09/01/2019     Basic Metabolic Panel  10/10/2019     Kidney Microalbumin Urine Test  10/10/2019     Cholesterol Lab  10/24/2019     PHQ-2  01/01/2020       Please call us at [phone] (or use [email or web address]) to address the above recommendations.     Thank you for trusting [facility] and we appreciate the opportunity to serve you.  We look forward to supporting your healthcare needs in the future.    Healthy Regards,    Shoaib Deras MD

## 2020-02-16 DIAGNOSIS — I10 BENIGN ESSENTIAL HYPERTENSION: ICD-10-CM

## 2020-02-17 RX ORDER — CARVEDILOL 12.5 MG/1
TABLET ORAL
Qty: 60 TABLET | Refills: 0 | Status: SHIPPED | OUTPATIENT
Start: 2020-02-17 | End: 2020-07-09

## 2020-02-17 NOTE — TELEPHONE ENCOUNTER
"Requested Prescriptions   Pending Prescriptions Disp Refills     carvedilol (COREG) 12.5 MG tablet [Pharmacy Med Name: CARVEDILOL 12.5 MG TABLET] 60 tablet 5     Sig: TAKE 1 TABLET BY MOUTH TWICE A DAY WITH MEALS   Last Written Prescription Date:  7/26/2019  Last Fill Quantity: 60,  # refills: 5   Last office visit: 7/26/2019 with prescribing provider:  Lisa   Future Office Visit:        Beta-Blockers Protocol Failed - 2/16/2020 12:51 PM        Failed - Blood pressure under 140/90 in past 12 months     BP Readings from Last 3 Encounters:   07/26/19 (!) 150/98   12/19/18 (!) 154/92   11/14/18 (!) 163/113                 Passed - Patient is age 6 or older        Passed - Recent (12 mo) or future (30 days) visit within the authorizing provider's specialty     Patient has had an office visit with the authorizing provider or a provider within the authorizing providers department within the previous 12 mos or has a future within next 30 days. See \"Patient Info\" tab in inbasket, or \"Choose Columns\" in Meds & Orders section of the refill encounter.              Passed - Medication is active on med list         A 30 day supply is given, patient is due for an office visit.  Please call to  assist the patient in scheduling an appointment. Per LOV note from 7/26/2019: Return in about 1 month (around 8/26/2019) for BP Recheck.     ANGEL OlivaN, RN  [employer] [facility]    "

## 2020-02-18 NOTE — TELEPHONE ENCOUNTER
Called pt at [phone] and left non detailed message to call back.  See below.  Due for  HTN follow up.  Madelin Chris

## 2020-02-21 ENCOUNTER — OFFICE VISIT (OUTPATIENT)
Dept: FAMILY MEDICINE | Facility: CLINIC | Age: 75
End: 2020-02-21
Payer: COMMERCIAL

## 2020-02-21 VITALS
BODY MASS INDEX: 36.83 KG/M2 | OXYGEN SATURATION: 99 % | HEART RATE: 94 BPM | WEIGHT: 243 LBS | SYSTOLIC BLOOD PRESSURE: 138 MMHG | TEMPERATURE: 98.1 F | DIASTOLIC BLOOD PRESSURE: 88 MMHG | HEIGHT: 68 IN

## 2020-02-21 DIAGNOSIS — E66.01 MORBID OBESITY (H): ICD-10-CM

## 2020-02-21 DIAGNOSIS — Z23 NEED FOR PNEUMOCOCCAL VACCINATION: ICD-10-CM

## 2020-02-21 DIAGNOSIS — Z00.00 ENCOUNTER FOR MEDICARE ANNUAL WELLNESS EXAM: ICD-10-CM

## 2020-02-21 DIAGNOSIS — N18.30 CKD (CHRONIC KIDNEY DISEASE) STAGE 3, GFR 30-59 ML/MIN (H): ICD-10-CM

## 2020-02-21 DIAGNOSIS — I10 BENIGN ESSENTIAL HYPERTENSION: Primary | ICD-10-CM

## 2020-02-21 DIAGNOSIS — Z13.220 SCREENING FOR LIPID DISORDERS: ICD-10-CM

## 2020-02-21 LAB
ALBUMIN SERPL-MCNC: 3.8 G/DL (ref 3.4–5)
ALP SERPL-CCNC: 54 U/L (ref 40–150)
ALT SERPL W P-5'-P-CCNC: 22 U/L (ref 0–70)
ANION GAP SERPL CALCULATED.3IONS-SCNC: 7 MMOL/L (ref 3–14)
AST SERPL W P-5'-P-CCNC: 10 U/L (ref 0–45)
BILIRUB SERPL-MCNC: 0.1 MG/DL (ref 0.2–1.3)
BUN SERPL-MCNC: 20 MG/DL (ref 7–30)
CALCIUM SERPL-MCNC: 9.6 MG/DL (ref 8.5–10.1)
CHLORIDE SERPL-SCNC: 105 MMOL/L (ref 94–109)
CHOLEST SERPL-MCNC: 180 MG/DL
CO2 SERPL-SCNC: 25 MMOL/L (ref 20–32)
CREAT SERPL-MCNC: 1.35 MG/DL (ref 0.66–1.25)
ERYTHROCYTE [DISTWIDTH] IN BLOOD BY AUTOMATED COUNT: 16.6 % (ref 10–15)
GFR SERPL CREATININE-BSD FRML MDRD: 52 ML/MIN/{1.73_M2}
GLUCOSE SERPL-MCNC: 100 MG/DL (ref 70–99)
HCT VFR BLD AUTO: 36.2 % (ref 40–53)
HDLC SERPL-MCNC: 39 MG/DL
HGB BLD-MCNC: 11.9 G/DL (ref 13.3–17.7)
LDLC SERPL CALC-MCNC: 108 MG/DL
MCH RBC QN AUTO: 25.9 PG (ref 26.5–33)
MCHC RBC AUTO-ENTMCNC: 32.9 G/DL (ref 31.5–36.5)
MCV RBC AUTO: 79 FL (ref 78–100)
NONHDLC SERPL-MCNC: 141 MG/DL
PLATELET # BLD AUTO: 333 10E9/L (ref 150–450)
POTASSIUM SERPL-SCNC: 4.4 MMOL/L (ref 3.4–5.3)
PROT SERPL-MCNC: 8.4 G/DL (ref 6.8–8.8)
RBC # BLD AUTO: 4.59 10E12/L (ref 4.4–5.9)
SODIUM SERPL-SCNC: 137 MMOL/L (ref 133–144)
TRIGL SERPL-MCNC: 163 MG/DL
TSH SERPL DL<=0.005 MIU/L-ACNC: 1.14 MU/L (ref 0.4–4)
WBC # BLD AUTO: 4.6 10E9/L (ref 4–11)

## 2020-02-21 PROCEDURE — 85027 COMPLETE CBC AUTOMATED: CPT | Performed by: NURSE PRACTITIONER

## 2020-02-21 PROCEDURE — 99213 OFFICE O/P EST LOW 20 MIN: CPT | Mod: 25 | Performed by: NURSE PRACTITIONER

## 2020-02-21 PROCEDURE — 82043 UR ALBUMIN QUANTITATIVE: CPT | Performed by: NURSE PRACTITIONER

## 2020-02-21 PROCEDURE — 84443 ASSAY THYROID STIM HORMONE: CPT | Performed by: NURSE PRACTITIONER

## 2020-02-21 PROCEDURE — 90670 PCV13 VACCINE IM: CPT | Performed by: NURSE PRACTITIONER

## 2020-02-21 PROCEDURE — 80061 LIPID PANEL: CPT | Performed by: NURSE PRACTITIONER

## 2020-02-21 PROCEDURE — 36415 COLL VENOUS BLD VENIPUNCTURE: CPT | Performed by: NURSE PRACTITIONER

## 2020-02-21 PROCEDURE — 99397 PER PM REEVAL EST PAT 65+ YR: CPT | Mod: 25 | Performed by: NURSE PRACTITIONER

## 2020-02-21 PROCEDURE — 90471 IMMUNIZATION ADMIN: CPT | Performed by: NURSE PRACTITIONER

## 2020-02-21 PROCEDURE — 80053 COMPREHEN METABOLIC PANEL: CPT | Performed by: NURSE PRACTITIONER

## 2020-02-21 ASSESSMENT — MIFFLIN-ST. JEOR: SCORE: 1826.74

## 2020-02-21 NOTE — PROGRESS NOTES
"Subjective     Bharat Bain is a 74 year old male who presents to clinic today for the following health issues:    HPI       Hypertension Follow-up   Had one epsiode of dizzyness about 4-5 days ago that last for about 3 minutes- was sitting down.   Home blood pressures 120's/80's.     Do you check your blood pressure regularly outside of the clinic? Yes     Are you following a low salt diet? Yes    Are your blood pressures ever more than 140 on the top number (systolic) OR more   than 90 on the bottom number (diastolic), for example 140/90? no      How many servings of fruits and vegetables do you eat daily?  0-1    On average, how many sweetened beverages do you drink each day (Examples: soda, juice, sweet tea, etc.  Do NOT count diet or artificially sweetened beverages)?   0    How many days per week do you exercise enough to make your heart beat faster? 3 or less - walks around - but not a lot during the winter.      How many minutes a day do you exercise enough to make your heart beat faster? 20 - 29    How many days per week do you miss taking your medication? 0    Annual Wellness Visit    Are you in the first 12 months of your Medicare Part B coverage?  No    Physical Health:    In general, how would you rate your overall physical health? excellent    Outside of work, how many days during the week do you exercise?6-7 days/week walking in apartment    Outside of work, approximately how many minutes a day do you exercise?15-30 minutes    If you drink alcohol do you typically have >3 drinks per day or >7 drinks per week? No    Do you usually eat at least 4 servings of fruit and vegetables a day, include whole grains & fiber and avoid regularly eating high fat or \"junk\" foods? Yes    Do you have any problems taking medications regularly? No    Do you have any side effects from medications? none    Needs assistance for the following daily activities: no assistance needed      Which of the following safety " concerns are present in your home?  none identified     Hearing impairment: No    In the past 6 months, have you been bothered by leaking of urine? no    Mental Health:    In general, how would you rate your overall mental or emotional health? good  PHQ-2 Score:      Do you feel safe in your environment? Yes    Have you ever done Advance Care Planning? (For example, a Health Directive, POLST, or a discussion with a medical provider or your loved ones about your wishes)? No, advance care planning information given to patient to review.  Patient plans to discuss their wishes with loved ones or provider.      Fall risk:  No fall history.        Cognitive Screenin) Repeat 3 items (Leader, Season, Table)    2) Clock draw: NORMAL  3) 3 item recall: Recalls NO objects   Results: NORMAL clock, 0 items recalled: COGNITIVE IMPAIRMENT LESS LIKELY    Mini-CogTM Copyright S Ines. Licensed by the author for use in [facility]; reprinted with permission (soob@[facility]). All rights reserved.      Do you have sleep apnea, excessive snoring or daytime drowsiness?: yes - +Snoring, No witnessed apnea    Current providers sharing in care for this patient include:   Patient Care Team:  Shoaib Deras MD as PCP - General (Family Practice)  Linda Gonzalez APRN CNP as Assigned PCP        Is followed by nephrologist (planning follow-up in 2020).  [facility] in [place], [place].          Previously lived in [place] and recently moved to [place].    Lives with wife.  Is originally from [facility] [place] [place] [facility].  Has been here for almost 3 years.    5 living children.  4 children live here in [place].  1 other son lives in [facility] [place] [place] [facility].       Son (Estella) lives in [place] and helps with appointments.    Patient Active Problem List   Diagnosis     Benign essential hypertension     CKD (chronic kidney disease) stage 3, GFR 30-59 ml/min (H)     Hyperlipidemia LDL goal <130     Obesity (BMI 35.0-39.9) with comorbidity (H)      Past Surgical History:   Procedure Laterality Date     COLONOSCOPY N/A 11/14/2018    Procedure: COMBINED COLONOSCOPY, SINGLE OR MULTIPLE BIOPSY/POLYPECTOMY BY BIOPSY;  Surgeon: Azalea Byrne MD;  Location:  GI       Social History     Tobacco Use     Smoking status: Never Smoker     Smokeless tobacco: Never Used   Substance Use Topics     Alcohol use: No     Family History   Problem Relation Age of Onset     Unknown/Adopted Mother      Unknown/Adopted Father      Diabetes No family hx of      Coronary Artery Disease No family hx of      Hypertension No family hx of      Hyperlipidemia No family hx of      Cerebrovascular Disease No family hx of      Breast Cancer No family hx of      Colon Cancer No family hx of      Prostate Cancer No family hx of      Other Cancer No family hx of      Depression No family hx of      Anxiety Disorder No family hx of      Mental Illness No family hx of      Substance Abuse No family hx of      Anesthesia Reaction No family hx of      Asthma No family hx of      Osteoporosis No family hx of      Genetic Disorder No family hx of      Thyroid Disease No family hx of      Obesity No family hx of          Current Outpatient Medications   Medication Sig Dispense Refill     amLODIPine (NORVASC) 10 MG tablet Take 1 tablet (10 mg) by mouth daily 30 tablet 5     aspirin 81 MG tablet Take 81 mg by mouth daily       carvedilol (COREG) 12.5 MG tablet TAKE 1 TABLET BY MOUTH TWICE A DAY WITH MEALS 60 tablet 0     lisinopril (PRINIVIL/ZESTRIL) 40 MG tablet Take 1 tablet (40 mg) by mouth daily 30 tablet 5     vitamin D3 (CHOLECALCIFEROL) 2000 units (50 mcg) tablet Take 1 tablet (2,000 Units) by mouth daily (Patient not taking: Reported on 2/21/2020) 90 tablet 3     Allergies   Allergen Reactions     Pork Derived Products      Does not eat pork       Reviewed and updated as needed this visit by Provider         Review of Systems   ROS COMP: Constitutional, HEENT, cardiovascular, pulmonary,  "gi and gu systems are negative, except as otherwise noted.      Objective    /88   Pulse 94   Temp 98.1  F (36.7  C) (Oral)   Ht 1.727 m (5' 8\")   Wt 110.2 kg (243 lb)   SpO2 99%   BMI 36.95 kg/m    Body mass index is 36.95 kg/m .  Physical Exam   GENERAL: healthy, alert and no distress  NECK: no adenopathy, no asymmetry, masses, or scars and thyroid normal to palpation  RESP: lungs clear to auscultation - no rales, rhonchi or wheezes  CV: regular rate and rhythm, normal S1 S2, no S3 or S4, no murmur, click or rub, no peripheral edema  PSYCH: mentation appears normal, affect normal/bright          Results for orders placed or performed in visit on 02/21/20   Albumin Random Urine Quantitative with Creat Ratio     Status: Abnormal   Result Value Ref Range    Creatinine Urine 20 mg/dL    Albumin Urine mg/L 88 mg/L    Albumin Urine mg/g Cr 431.22 (H) 0 - 17 mg/g Cr   Lipid panel reflex to direct LDL Fasting     Status: Abnormal   Result Value Ref Range    Cholesterol 180 <200 mg/dL    Triglycerides 163 (H) <150 mg/dL    HDL Cholesterol 39 (L) >39 mg/dL    LDL Cholesterol Calculated 108 (H) <100 mg/dL    Non HDL Cholesterol 141 (H) <130 mg/dL   Comprehensive metabolic panel (BMP + Alb, Alk Phos, ALT, AST, Total. Bili, TP)     Status: Abnormal   Result Value Ref Range    Sodium 137 133 - 144 mmol/L    Potassium 4.4 3.4 - 5.3 mmol/L    Chloride 105 94 - 109 mmol/L    Carbon Dioxide 25 20 - 32 mmol/L    Anion Gap 7 3 - 14 mmol/L    Glucose 100 (H) 70 - 99 mg/dL    Urea Nitrogen 20 7 - 30 mg/dL    Creatinine 1.35 (H) 0.66 - 1.25 mg/dL    GFR Estimate 52 (L) >60 mL/min/[1.73_m2]    GFR Estimate If Black 60 (L) >60 mL/min/[1.73_m2]    Calcium 9.6 8.5 - 10.1 mg/dL    Bilirubin Total 0.1 (L) 0.2 - 1.3 mg/dL    Albumin 3.8 3.4 - 5.0 g/dL    Protein Total 8.4 6.8 - 8.8 g/dL    Alkaline Phosphatase 54 40 - 150 U/L    ALT 22 0 - 70 U/L    AST 10 0 - 45 U/L   TSH with free T4 reflex     Status: None   Result Value Ref " Range    TSH 1.14 0.40 - 4.00 mU/L   CBC with platelets     Status: Abnormal   Result Value Ref Range    WBC 4.6 4.0 - 11.0 10e9/L    RBC Count 4.59 4.4 - 5.9 10e12/L    Hemoglobin 11.9 (L) 13.3 - 17.7 g/dL    Hematocrit 36.2 (L) 40.0 - 53.0 %    MCV 79 78 - 100 fl    MCH 25.9 (L) 26.5 - 33.0 pg    MCHC 32.9 31.5 - 36.5 g/dL    RDW 16.6 (H) 10.0 - 15.0 %    Platelet Count 333 150 - 450 10e9/L       Assessment & Plan     Bharat was seen today for dizziness.    Diagnoses and all orders for this visit:    Benign essential hypertension  Blood pressure currently stable on Amlodipine, 10 mg, Lisinopril, 40 mg and Coreg 12.5 mg 2 times daily.    -     Comprehensive metabolic panel (BMP + Alb, Alk Phos, ALT, AST, Total. Bili, TP)  -     TSH with free T4 reflex  -     CBC with platelets    CKD (chronic kidney disease) stage 3, GFR 30-59 ml/min (H)  Followed by nephrologist ([facility]) - [place]  -     Albumin Random Urine Quantitative with Creat Ratio    Morbid obesity (H)  Continue to work with patient regarding dietary and lifestyle modifications to support weight loss.        Encounter for Medicare annual wellness exam  Need for pneumococcal vaccination  -     PCV13, IM (6+ WK) - [id number]  -     ADMIN 1st VACCINE    Screening for lipid disorders  -     Lipid panel reflex to direct LDL Fasting          Return in about 6 months (around 8/21/2020) for Med check.    Linda Gonzalez, MIHAELA DAVEY  [facility]

## 2020-02-21 NOTE — LETTER
March 2, 2020      Bharat Bain  [address]  [id number]  [facility] [address]        Dear ,    We are writing to inform you of your test results.    -Decreased red blood cell (hgb) levels (stable from previous levels), normal white blood cell count and normal platelet levels.     -LDL(bad) cholesterol level is elevated, HDL(good) cholesterol level is low and your triglycerides are elevated which can increase your heart disease risk.  A diet high in fat and simple carbohydrates, genetics and being overweight can contribute to this. ADVISE: exercising 150 minutes of aerobic exercise per week (30 minutes for 5 days per week or 50 minutes for 3 days per week are options), eating a low saturated fat/low carbohydrate diet, and omega-3 fatty acids (fish oil) 5688-7048 mg daily are helpful to improve this. In 12 months, you should recheck your fasting cholesterol panel.       -Liver and gallbladder tests are normal (ALT,AST, Alk phos, bilirubin), kidney function is normal (Cr, GFR), sodium is normal, potassium is normal, calcium is normal, glucose is overall stable.     -TSH (thyroid stimulating hormone) level is normal which indicates normal thyroid function.     -Microalbumin (urine protein) level is elevated, but stable.  Please continue to follow-up with your kidney specialist.     Resulted Orders   Albumin Random Urine Quantitative with Creat Ratio   Result Value Ref Range    Creatinine Urine 20 mg/dL    Albumin Urine mg/L 88 mg/L    Albumin Urine mg/g Cr 431.22 (H) 0 - 17 mg/g Cr   Lipid panel reflex to direct LDL Fasting   Result Value Ref Range    Cholesterol 180 <200 mg/dL    Triglycerides 163 (H) <150 mg/dL      Comment:      Borderline high:  150-199 mg/dl  High:             200-499 mg/dl  Very high:       >499 mg/dl  Non Fasting      HDL Cholesterol 39 (L) >39 mg/dL    LDL Cholesterol Calculated 108 (H) <100 mg/dL      Comment:      Above desirable:  100-129 mg/dl  Borderline High:  130-159  mg/dL  High:             160-189 mg/dL  Very high:       >189 mg/dl      Non HDL Cholesterol 141 (H) <130 mg/dL      Comment:      Above Desirable:  130-159 mg/dl  Borderline high:  160-189 mg/dl  High:             190-219 mg/dl  Very high:       >219 mg/dl     Comprehensive metabolic panel (BMP + Alb, Alk Phos, ALT, AST, Total. Bili, TP)   Result Value Ref Range    Sodium 137 133 - 144 mmol/L    Potassium 4.4 3.4 - 5.3 mmol/L    Chloride 105 94 - 109 mmol/L    Carbon Dioxide 25 20 - 32 mmol/L    Anion Gap 7 3 - 14 mmol/L    Glucose 100 (H) 70 - 99 mg/dL      Comment:      Non Fasting    Urea Nitrogen 20 7 - 30 mg/dL    Creatinine 1.35 (H) 0.66 - 1.25 mg/dL    GFR Estimate 52 (L) >60 mL/min/[1.73_m2]      Comment:      Non  GFR Calc  Starting 12/18/2018, serum creatinine based estimated GFR (eGFR) will be   calculated using the Chronic Kidney Disease Epidemiology Collaboration   (CKD-EPI) equation.      GFR Estimate If Black 60 (L) >60 mL/min/[1.73_m2]      Comment:       GFR Calc  Starting 12/18/2018, serum creatinine based estimated GFR (eGFR) will be   calculated using the Chronic Kidney Disease Epidemiology Collaboration   (CKD-EPI) equation.      Calcium 9.6 8.5 - 10.1 mg/dL    Bilirubin Total 0.1 (L) 0.2 - 1.3 mg/dL    Albumin 3.8 3.4 - 5.0 g/dL    Protein Total 8.4 6.8 - 8.8 g/dL    Alkaline Phosphatase 54 40 - 150 U/L    ALT 22 0 - 70 U/L    AST 10 0 - 45 U/L   TSH with free T4 reflex   Result Value Ref Range    TSH 1.14 0.40 - 4.00 mU/L   CBC with platelets   Result Value Ref Range    WBC 4.6 4.0 - 11.0 10e9/L    RBC Count 4.59 4.4 - 5.9 10e12/L    Hemoglobin 11.9 (L) 13.3 - 17.7 g/dL    Hematocrit 36.2 (L) 40.0 - 53.0 %    MCV 79 78 - 100 fl    MCH 25.9 (L) 26.5 - 33.0 pg    MCHC 32.9 31.5 - 36.5 g/dL    RDW 16.6 (H) 10.0 - 15.0 %    Platelet Count 333 150 - 450 10e9/L       If you have any questions or concerns, please call the clinic at the number listed above.        Sincerely,        Linda Gonzalez, MIHAELA CNP

## 2020-02-22 LAB
CREAT UR-MCNC: 20 MG/DL
MICROALBUMIN UR-MCNC: 88 MG/L
MICROALBUMIN/CREAT UR: 431.22 MG/G CR (ref 0–17)

## 2020-03-12 ENCOUNTER — OFFICE VISIT (OUTPATIENT)
Dept: FAMILY MEDICINE | Facility: CLINIC | Age: 75
End: 2020-03-12
Payer: COMMERCIAL

## 2020-03-12 VITALS
DIASTOLIC BLOOD PRESSURE: 86 MMHG | WEIGHT: 242 LBS | OXYGEN SATURATION: 99 % | BODY MASS INDEX: 36.68 KG/M2 | HEIGHT: 68 IN | HEART RATE: 91 BPM | SYSTOLIC BLOOD PRESSURE: 136 MMHG | TEMPERATURE: 99 F

## 2020-03-12 DIAGNOSIS — I10 BENIGN ESSENTIAL HYPERTENSION: Primary | ICD-10-CM

## 2020-03-12 PROCEDURE — 99213 OFFICE O/P EST LOW 20 MIN: CPT | Performed by: NURSE PRACTITIONER

## 2020-03-12 ASSESSMENT — MIFFLIN-ST. JEOR: SCORE: 1817.2

## 2020-03-12 NOTE — PROGRESS NOTES
Subjective     Bharat Bain is a 75 year old male who presents to clinic today for the following health issues:    HPI     Hypertension Follow-up    Patient presents today because he was recently seen at his dental provider where his blood pressure was elevated.    He comes into clinic today with a note requiring a provider review.        Do you check your blood pressure regularly outside of the clinic? Yes x 1 day ago it was 120/82     Are you following a low salt diet? Yes    Are your blood pressures ever more than 140 on the top number (systolic) OR more   than 90 on the bottom number (diastolic), for example 140/90? Yes      How many servings of fruits and vegetables do you eat daily?  0-1    On average, how many sweetened beverages do you drink each day (Examples: soda, juice, sweet tea, etc.  Do NOT count diet or artificially sweetened beverages)?   0    How many days per week do you exercise enough to make your heart beat faster? 3 or less    How many minutes a day do you exercise enough to make your heart beat faster? 20 - 29    How many days per week do you miss taking your medication? 0      Patient Active Problem List   Diagnosis     Benign essential hypertension     CKD (chronic kidney disease) stage 3, GFR 30-59 ml/min (H)     Hyperlipidemia LDL goal <130     Obesity (BMI 35.0-39.9) with comorbidity (H)     Past Surgical History:   Procedure Laterality Date     COLONOSCOPY N/A 11/14/2018    Procedure: COMBINED COLONOSCOPY, SINGLE OR MULTIPLE BIOPSY/POLYPECTOMY BY BIOPSY;  Surgeon: Azalea Byrne MD;  Location:  GI       Social History     Tobacco Use     Smoking status: Never Smoker     Smokeless tobacco: Never Used   Substance Use Topics     Alcohol use: No     Family History   Problem Relation Age of Onset     Unknown/Adopted Mother      Unknown/Adopted Father      Diabetes No family hx of      Coronary Artery Disease No family hx of      Hypertension No family hx of      Hyperlipidemia No  "family hx of      Cerebrovascular Disease No family hx of      Breast Cancer No family hx of      Colon Cancer No family hx of      Prostate Cancer No family hx of      Other Cancer No family hx of      Depression No family hx of      Anxiety Disorder No family hx of      Mental Illness No family hx of      Substance Abuse No family hx of      Anesthesia Reaction No family hx of      Asthma No family hx of      Osteoporosis No family hx of      Genetic Disorder No family hx of      Thyroid Disease No family hx of      Obesity No family hx of          Current Outpatient Medications   Medication Sig Dispense Refill     amLODIPine (NORVASC) 10 MG tablet Take 1 tablet (10 mg) by mouth daily 30 tablet 5     aspirin 81 MG tablet Take 81 mg by mouth daily       carvedilol (COREG) 12.5 MG tablet TAKE 1 TABLET BY MOUTH TWICE A DAY WITH MEALS 60 tablet 0     lisinopril (PRINIVIL/ZESTRIL) 40 MG tablet Take 1 tablet (40 mg) by mouth daily 30 tablet 5     vitamin D3 (CHOLECALCIFEROL) 2000 units (50 mcg) tablet Take 1 tablet (2,000 Units) by mouth daily (Patient not taking: Reported on 2/21/2020) 90 tablet 3     Allergies   Allergen Reactions     Pork Derived Products      Does not eat pork       Reviewed and updated as needed this visit by Provider  Tobacco  Allergies  Meds  Problems  Med Hx  Surg Hx  Fam Hx         Review of Systems   ROS COMP: Constitutional, HEENT, cardiovascular, pulmonary, gi and gu systems are negative, except as otherwise noted.      Objective    /86   Pulse 91   Temp 99  F (37.2  C) (Oral)   Ht 1.727 m (5' 8\")   Wt 109.8 kg (242 lb)   SpO2 99%   BMI 36.80 kg/m    Body mass index is 36.8 kg/m .  Physical Exam   GENERAL: healthy, alert and no distress  RESP: lungs clear to auscultation - no rales, rhonchi or wheezes  CV: regular rate and rhythm, normal S1 S2, no S3 or S4, no murmur  PSYCH: mentation appears normal, affect normal/bright    {  Assessment & Plan     Bharat was seen today for " hypertension.    Diagnoses and all orders for this visit:    Benign essential hypertension  Blood pressure currently stable on Amlodipine, 10 mg, Lisinopril, 40 mg and Coreg 12.5 mg 2 times daily.    Dental form completed and signed, original given to patient.          Return in about 6 months (around 9/12/2020) for BP Recheck.    MIHAELA Clark CNP  [facility]

## 2020-03-14 DIAGNOSIS — I10 BENIGN ESSENTIAL HYPERTENSION: ICD-10-CM

## 2020-03-15 RX ORDER — AMLODIPINE BESYLATE 10 MG/1
10 TABLET ORAL DAILY
Qty: 30 TABLET | Refills: 4 | Status: SHIPPED | OUTPATIENT
Start: 2020-03-15 | End: 2020-08-07

## 2020-03-15 NOTE — TELEPHONE ENCOUNTER
"  Last office visit: 3/12/2020 with prescribing provider:  Future Office Visit:    Requested Prescriptions   Pending Prescriptions Disp Refills     amLODIPine (NORVASC) 10 MG tablet [Pharmacy Med Name: AMLODIPINE BESYLATE 10 MG TAB] 30 tablet 1     Sig: TAKE 1 TABLET BY MOUTH EVERY DAY. NEEDS FOLLOW UP FOR FURTHER REFILLS       Calcium Channel Blockers Protocol  Failed - 3/14/2020  3:46 PM        Failed - Recent (12 mo) or future (30 days) visit within the authorizing provider's specialty     Patient has had an office visit with the authorizing provider or a provider within the authorizing providers department within the previous 12 mos or has a future within next 30 days. See \"Patient Info\" tab in inbasket, or \"Choose Columns\" in Meds & Orders section of the refill encounter.              Failed - Normal serum creatinine on file in past 12 months     Recent Labs   Lab Test 02/21/20  1108   CR 1.35*       Ok to refill medication if creatinine is low          Passed - Blood pressure under 140/90 in past 12 months     BP Readings from Last 3 Encounters:   03/12/20 136/86   02/21/20 138/88   07/26/19 (!) 150/98                 Passed - Medication is active on med list        Passed - Patient is age 18 or older             "

## 2020-07-09 DIAGNOSIS — I10 BENIGN ESSENTIAL HYPERTENSION: ICD-10-CM

## 2020-07-09 RX ORDER — CARVEDILOL 12.5 MG/1
TABLET ORAL
Qty: 180 TABLET | Refills: 1 | Status: SHIPPED | OUTPATIENT
Start: 2020-07-09 | End: 2020-11-02

## 2020-07-09 NOTE — TELEPHONE ENCOUNTER
Prescription approved per [facility] Refill Protocol.  Sherri Mauricio RN   [facility] - Triage

## 2020-08-07 DIAGNOSIS — I10 BENIGN ESSENTIAL HYPERTENSION: ICD-10-CM

## 2020-08-07 RX ORDER — AMLODIPINE BESYLATE 10 MG/1
TABLET ORAL
Qty: 30 TABLET | Refills: 4 | Status: SHIPPED | OUTPATIENT
Start: 2020-08-07 | End: 2020-11-02

## 2020-08-07 NOTE — TELEPHONE ENCOUNTER
Routing refill request to provider for review/approval because:  Labs out of range:  ANGEL StoddardN, RN  [employer] Triage

## 2020-10-12 DIAGNOSIS — I10 BENIGN ESSENTIAL HYPERTENSION: ICD-10-CM

## 2020-10-13 RX ORDER — LISINOPRIL 40 MG/1
40 TABLET ORAL DAILY
Qty: 90 TABLET | Refills: 1 | Status: SHIPPED | OUTPATIENT
Start: 2020-10-13 | End: 2020-11-02

## 2020-11-02 ENCOUNTER — ANCILLARY PROCEDURE (OUTPATIENT)
Dept: GENERAL RADIOLOGY | Facility: CLINIC | Age: 75
End: 2020-11-02
Attending: NURSE PRACTITIONER
Payer: COMMERCIAL

## 2020-11-02 ENCOUNTER — OFFICE VISIT (OUTPATIENT)
Dept: FAMILY MEDICINE | Facility: CLINIC | Age: 75
End: 2020-11-02
Payer: COMMERCIAL

## 2020-11-02 VITALS
BODY MASS INDEX: 36.87 KG/M2 | SYSTOLIC BLOOD PRESSURE: 128 MMHG | DIASTOLIC BLOOD PRESSURE: 82 MMHG | WEIGHT: 243.3 LBS | OXYGEN SATURATION: 99 % | HEART RATE: 93 BPM | HEIGHT: 68 IN | TEMPERATURE: 98 F

## 2020-11-02 DIAGNOSIS — Z23 NEED FOR PROPHYLACTIC VACCINATION AND INOCULATION AGAINST INFLUENZA: ICD-10-CM

## 2020-11-02 DIAGNOSIS — R22.41 LOCALIZED SWELLING OF RIGHT FOOT: ICD-10-CM

## 2020-11-02 DIAGNOSIS — E55.9 VITAMIN D DEFICIENCY: ICD-10-CM

## 2020-11-02 DIAGNOSIS — M10.9 ACUTE GOUT OF RIGHT FOOT, UNSPECIFIED CAUSE: ICD-10-CM

## 2020-11-02 DIAGNOSIS — I10 BENIGN ESSENTIAL HYPERTENSION: ICD-10-CM

## 2020-11-02 DIAGNOSIS — N18.31 STAGE 3A CHRONIC KIDNEY DISEASE (H): ICD-10-CM

## 2020-11-02 DIAGNOSIS — R22.41 LOCALIZED SWELLING OF RIGHT FOOT: Primary | ICD-10-CM

## 2020-11-02 LAB
ERYTHROCYTE [DISTWIDTH] IN BLOOD BY AUTOMATED COUNT: 16.2 % (ref 10–15)
HCT VFR BLD AUTO: 34.4 % (ref 40–53)
HGB BLD-MCNC: 11.4 G/DL (ref 13.3–17.7)
MCH RBC QN AUTO: 26.3 PG (ref 26.5–33)
MCHC RBC AUTO-ENTMCNC: 33.1 G/DL (ref 31.5–36.5)
MCV RBC AUTO: 79 FL (ref 78–100)
PLATELET # BLD AUTO: 263 10E9/L (ref 150–450)
RBC # BLD AUTO: 4.34 10E12/L (ref 4.4–5.9)
WBC # BLD AUTO: 4.7 10E9/L (ref 4–11)

## 2020-11-02 PROCEDURE — 84550 ASSAY OF BLOOD/URIC ACID: CPT | Performed by: NURSE PRACTITIONER

## 2020-11-02 PROCEDURE — 90662 IIV NO PRSV INCREASED AG IM: CPT | Performed by: NURSE PRACTITIONER

## 2020-11-02 PROCEDURE — 36415 COLL VENOUS BLD VENIPUNCTURE: CPT | Performed by: NURSE PRACTITIONER

## 2020-11-02 PROCEDURE — 80048 BASIC METABOLIC PNL TOTAL CA: CPT | Performed by: NURSE PRACTITIONER

## 2020-11-02 PROCEDURE — 99214 OFFICE O/P EST MOD 30 MIN: CPT | Mod: 25 | Performed by: NURSE PRACTITIONER

## 2020-11-02 PROCEDURE — 82728 ASSAY OF FERRITIN: CPT | Performed by: NURSE PRACTITIONER

## 2020-11-02 PROCEDURE — 85027 COMPLETE CBC AUTOMATED: CPT | Performed by: NURSE PRACTITIONER

## 2020-11-02 PROCEDURE — 73630 X-RAY EXAM OF FOOT: CPT | Mod: RT | Performed by: RADIOLOGY

## 2020-11-02 PROCEDURE — G0008 ADMIN INFLUENZA VIRUS VAC: HCPCS | Performed by: NURSE PRACTITIONER

## 2020-11-02 RX ORDER — AMLODIPINE BESYLATE 10 MG/1
10 TABLET ORAL DAILY
Qty: 180 TABLET | Refills: 1 | Status: SHIPPED | OUTPATIENT
Start: 2020-11-02 | End: 2021-08-25

## 2020-11-02 RX ORDER — CHOLECALCIFEROL (VITAMIN D3) 50 MCG
1 TABLET ORAL DAILY
Qty: 180 TABLET | Refills: 1 | Status: SHIPPED | OUTPATIENT
Start: 2020-11-02

## 2020-11-02 RX ORDER — LISINOPRIL 40 MG/1
40 TABLET ORAL DAILY
Qty: 180 TABLET | Refills: 1 | Status: SHIPPED | OUTPATIENT
Start: 2020-11-02 | End: 2021-08-25

## 2020-11-02 RX ORDER — CARVEDILOL 12.5 MG/1
12.5 TABLET ORAL 2 TIMES DAILY WITH MEALS
Qty: 360 TABLET | Refills: 1 | Status: SHIPPED | OUTPATIENT
Start: 2020-11-02 | End: 2021-08-25

## 2020-11-02 ASSESSMENT — MIFFLIN-ST. JEOR: SCORE: 1823.1

## 2020-11-02 NOTE — PATIENT INSTRUCTIONS
Patient Education     Gout    Gout is an inflammation of a joint caused by an inflammatory response to gout crystals in the joint fluid. This occurs when there is excess uric acid. Uric acid is a normal waste product in the body. It builds up in the body when the kidneys can't filter enough of it from the blood. This may occur with age. It is also associated with kidney disease. Gout occurs more often in people with obesity, diabetes, high blood pressure, or high levels of fats in the blood. It may run in families. Gout tends to come and go. A flare up of gout is called an attack. Drinking alcohol or eating certain foods (such as shellfish or foods with additives such as high-fructose corn syrup) may increase uric acid levels in the blood and cause a gout attack.   During a gout attack, the affected joint may become hot, red, swollen, and painful. If you have had one attack of gout, you are likely to have another. An attack of gout can be treated with medicine. If these attacks become frequent, a daily medicine may be prescribed to help the kidneys remove uric acid from the body.   Home care  During a gout attack:    Contact your healthcare provider for advice and therapy options at the early stages of a gout flare. Treating the flare right away can prevent it from getting worse.    Rest painful joints. If gout affects the joints of your foot or leg, you may want to use crutches for the first few days to keep from bearing weight on the affected joint.    When sitting or lying down, raise the painful joint to a level higher than your heart.    Apply an ice pack (ice cubes in a plastic bag wrapped in a thin towel) over the injured area for 20 minutes every 1 to 2 hours the first day for pain relief. Continue this 3 to 4 times a day for swelling and pain.    Avoid alcohol and foods listed below (see Preventing attacks) during a gout attack. Drink extra fluid to help flush the uric acid through your kidneys.    If you  were prescribed a medicine to treat gout, take it as your healthcare provider has instructed. Don't skip doses.    Take anti-inflammatory medicine as directed by your healthcare provider.    If pain medicines have been prescribed, take them exactly as directed.    Preventing attacks    Limit or stop  alcohol use. Excess alcohol intake can cause a gout attack.    Limit these foods and beverages:  ? Organ meats, such as kidneys and liver  ? Certain seafoods (anchovies, sardines, shrimp, scallops, herring, mackerel)  ? Wild game, meat extracts and meat gravies  ? Foods and beverages sweetened with high-fructose corn syrup, such as sodas    Eat a healthy diet including low-fat and nonfat dairy, whole grains, and vegetables.    If you are overweight, talk to your healthcare provider about a weight reduction plan. Avoid fasting or extreme low calorie diets (less than 900 calories per day). This will increase uric acid levels in the body.    If you have diabetes or high blood pressure, work with your doctor to manage these conditions.    Protect the joint from injury. Wear good fitting socks and shoes. Injury can trigger a gout attack.    Follow-up care  Follow up with your healthcare provider, or as advised.   When to seek medical advice  Call your healthcare provider if you have any of the following:    Fever over 100.4 F (38. C) with worsening joint pain    Increasing redness around the joint    Pain developing in another joint    Repeated vomiting, abdominal pain, or blood in the vomit or stool (black or red color)  Ki last reviewed this educational content on 6/1/2019 2000-2020 [employer]. [address] [place] [address], [place] [address]. All rights reserved. This information is not intended as a substitute for professional medical care. Always follow your healthcare professional's instructions.           Patient Education     Gout Diet  Gout is a painful condition caused by an excess of uric acid, a  waste product made by the body. Uric acid forms crystals that collect in the joints. The immune response to these crystals brings on symptoms of joint pain and swelling. This is called a gout attack. Often, medications and diet changes are combined to manage gout. Below are some guidelines for changing your diet to help you manage gout and prevent attacks. Your healthcare provider will help you determine the best eating plan for you.  Eating to manage gout  Weight loss for those who are overweight may help reduce gout attacks.  Eat less of these foods  Eating too many foods containing purines may raise the levels of uric acid in your body. This raises your risk for a gout attack. Try to limit these foods and drinks:    Alcohol, such as beer and red wine. You may be told to avoid alcohol completely.    Soft drinks that contain sugar or high fructose corn syrup    Certain fish, including anchovies, sardines, fish eggs, and herring    Shellfish    Certain meats, such as red meat, hot dogs, luncheon meats, and turkey    Organ meats, such as liver, kidneys, and sweetbreads    Legumes, such as dried beans and peas    Other high fat foods such as gravy, whole milk, and high fat cheeses    Vegetables such as asparagus, cauliflower, spinach, and mushrooms used to be thought to contribute to an increased risk for a gout attack, but recent studies show that high purine vegetables don't increase the risk for a gout attack.  Eat more of these foods  Other foods may be helpful for people with gout. Add some of these foods to your diet:    Cherries contain chemicals that may lower uric acid.    Omega fatty acids. These are found in some fatty fish such as salmon, certain oils (flax, olive, or nut), and nuts themselves. Omega fatty acids may help prevent inflammation due to gout.    Dairy products that are low-fat or fat-free, such as cheese and yogurt    Complex carbohydrate foods, including whole grains, brown rice, oats, and  beans    Coffee, in moderation    Water, approximately 64 ounces per day  Follow-up care  Follow up with your healthcare provider as advised.  When to seek medical advice  Call your healthcare provider right away if any of these occur:    Return of gout symptoms, usually at night:    Severe pain, swelling, and heat in a joint, especially the base of the big toe    Affected joint is hard to move    Skin of the affected joint is purple or red    Fever of 100.4 F (38 C) or higher    Pain that doesn't get better even with prescribed medicine   Ki last reviewed this educational content on 6/1/2018 2000-2020 [employer] [place]. [address] [place] [address], [place] [address]. All rights reserved. This information is not intended as a substitute for professional medical care. Always follow your healthcare professional's instructions.

## 2020-11-02 NOTE — LETTER
[facility]                    November 3, 2020    Bharat Bain  [address]  [facility] [address]      Dear Bharat,    Here is a summary of your recent test results:    -Hemoglobin is decreased indicating anemia, but stable with previous values.     -White blood cell and platelet counts are normal.   -Kidney function (GFR) is stable.     -Sodium is normal.   -Potassium is normal.   -Calcium is normal.   -Glucose (diabetic screening test) is normal.   -Ferritin (iron) level is normal.   -Uric acid (blood test for gout) was elevated which can be an indication of a build up of uric acid in your blood.       Your test results are enclosed.      Please contact me if you have any questions.           Thank you very much for trusting [facility].     Healthy regards,    Linda Gonzalez, MIHAELA, CNP           Results for orders placed or performed in visit on 11/02/20   XR Foot Right G/E 3 Views     Status: None    Narrative    Examination:  XR FOOT RT G/E 3 VW    Date:  11/2/2020 1:55 PM     Clinical Information: Localized swelling of right foot     Additional Information: none    Comparison: none      Impression    Impression:    1.  Soft tissue swelling throughout the right foot.  2.  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 Maintained joint spacing elsewhere.  3.  No fracture, erosion, or bone lesion.    LUIS ALBERTO SUN MD   Results for orders placed or performed in visit on 11/02/20   Uric acid     Status: Abnormal   Result Value Ref Range    Uric Acid 7.8 (H) 3.5 - 7.2 mg/dL   CBC with platelets     Status: Abnormal   Result Value Ref Range    WBC 4.7 4.0 - 11.0 10e9/L    RBC Count 4.34 (L) 4.4 - 5.9 10e12/L    Hemoglobin 11.4 (L) 13.3 - 17.7 g/dL    Hematocrit 34.4 (L) 40.0 - 53.0 %    MCV 79 78 - 100 fl    MCH 26.3 (L) 26.5 - 33.0 pg    MCHC 33.1 31.5 - 36.5 g/dL    RDW 16.2 (H) 10.0 - 15.0 %    Platelet Count 263 150 - 450 10e9/L    Ferritin     Status: None   Result Value Ref Range    Ferritin 167 26 - 388 ng/mL   Basic metabolic panel  (Ca, Cl, CO2, Creat, Gluc, K, Na, BUN)     Status: Abnormal   Result Value Ref Range    Sodium 137 133 - 144 mmol/L    Potassium 4.3 3.4 - 5.3 mmol/L    Chloride 104 94 - 109 mmol/L    Carbon Dioxide 28 20 - 32 mmol/L    Anion Gap 5 3 - 14 mmol/L    Glucose 91 70 - 99 mg/dL    Urea Nitrogen 17 7 - 30 mg/dL    Creatinine 1.48 (H) 0.66 - 1.25 mg/dL    GFR Estimate 46 (L) >60 mL/min/[1.73_m2]    GFR Estimate If Black 53 (L) >60 mL/min/[1.73_m2]    Calcium 9.5 8.5 - 10.1 mg/dL

## 2020-11-02 NOTE — PROGRESS NOTES
"Subjective     Bharat Bain is a 75 year old male who presents to clinic today for the following health issues:    HPI      Joint Concern    Onset/Duration: 3 days ago  Description:   Location: big toe - right  Joint Swelling: YES  Redness: YES  Pain: YES  Intensity: severe  Progression of Symptoms: bending getting worst and walking is difficult   Was able to walk on foot this morning.      History of trauma 2 years ago. No recent injury or trauma.      Accompanying Signs & Symptoms:  Fevers: no  History:   Trauma to the area: YES  Previous history of gout: YES  Recent illness: no  Alcohol use: no  Diuretic use: no  Precipitating or alleviating factors: None  Therapies tried and outcome: none    HTN:    Blood pressure is stable.  No concerns.        Is traveling to [place] in approximately 1 month and will be done for 5 months.  Requesting prescriptions.       Review of Systems   Constitutional, HEENT, cardiovascular, pulmonary, gi and gu systems are negative, except as otherwise noted.      Objective    /82   Pulse 93   Temp 98  F (36.7  C) (Tympanic)   Ht 1.727 m (5' 8\")   Wt 110.4 kg (243 lb 4.8 oz)   SpO2 99%   BMI 36.99 kg/m    Body mass index is 36.99 kg/m .  Physical Exam   GENERAL: healthy, alert and no distress  RESP: lungs clear to auscultation - no rales, rhonchi or wheezes  CV: regular rate and rhythm, normal S1 S2, no S3 or S4, no murmur, click or rub, peripheral pulses strong  MS: right great toe and dorsal aspect of foot with generalized swelling, warmth and tenderness to palpation over MTP of great toe.   NEURO: Normal strength and tone, mentation intact and speech normal  PSYCH: mentation appears normal, affect normal/bright        Results for orders placed or performed in visit on 11/02/20   XR Foot Right G/E 3 Views     Status: None    Narrative    Examination:  XR FOOT RT G/E 3 VW    Date:  11/2/2020 1:55 PM     Clinical Information: Localized swelling of right foot     Additional " Information: none    Comparison: none      Impression    Impression:    1.  Soft tissue swelling throughout the right foot.  2.  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 Maintained joint spacing elsewhere.  3.  No fracture, erosion, or bone lesion.    LUIS ALBERTO SUN MD   Results for orders placed or performed in visit on 11/02/20   Uric acid     Status: Abnormal   Result Value Ref Range    Uric Acid 7.8 (H) 3.5 - 7.2 mg/dL   CBC with platelets     Status: Abnormal   Result Value Ref Range    WBC 4.7 4.0 - 11.0 10e9/L    RBC Count 4.34 (L) 4.4 - 5.9 10e12/L    Hemoglobin 11.4 (L) 13.3 - 17.7 g/dL    Hematocrit 34.4 (L) 40.0 - 53.0 %    MCV 79 78 - 100 fl    MCH 26.3 (L) 26.5 - 33.0 pg    MCHC 33.1 31.5 - 36.5 g/dL    RDW 16.2 (H) 10.0 - 15.0 %    Platelet Count 263 150 - 450 10e9/L   Ferritin     Status: None   Result Value Ref Range    Ferritin 167 26 - 388 ng/mL   Basic metabolic panel  (Ca, Cl, CO2, Creat, Gluc, K, Na, BUN)     Status: Abnormal   Result Value Ref Range    Sodium 137 133 - 144 mmol/L    Potassium 4.3 3.4 - 5.3 mmol/L    Chloride 104 94 - 109 mmol/L    Carbon Dioxide 28 20 - 32 mmol/L    Anion Gap 5 3 - 14 mmol/L    Glucose 91 70 - 99 mg/dL    Urea Nitrogen 17 7 - 30 mg/dL    Creatinine 1.48 (H) 0.66 - 1.25 mg/dL    GFR Estimate 46 (L) >60 mL/min/[1.73_m2]    GFR Estimate If Black 53 (L) >60 mL/min/[1.73_m2]    Calcium 9.5 8.5 - 10.1 mg/dL         Assessment & Plan     Bharat was seen today for foot pain and imm/inj.    Diagnoses and all orders for this visit:    Localized swelling of right foot  Acute gout of right foot, unspecified cause  Discussed short course of NSAIDs or Indomethacin for acute gout, patient declined (as pain has slowly improved) and will continue with Acetaminophen as needed.    Discussed dietary modifications to prevent gout attacks.    -     XR Foot Right G/E 3 Views; Future - no acute bony abnormalities  1.  Soft tissue  "swelling throughout the right foot.  2.  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 Maintained joint spacing elsewhere.  3.  No fracture, erosion, or bone lesion.  -     Uric acid  -     CBC with platelets  -     Ferritin    Benign essential hypertension  Currently well controlled on 3 antihypertensive regimen.    -     amLODIPine (NORVASC) 10 MG tablet; Take 1 tablet (10 mg) by mouth daily  -     carvedilol (COREG) 12.5 MG tablet; Take 1 tablet (12.5 mg) by mouth 2 times daily (with meals)  -     lisinopril (ZESTRIL) 40 MG tablet; Take 1 tablet (40 mg) by mouth daily  -     Basic metabolic panel  (Ca, Cl, CO2, Creat, Gluc, K, Na, BUN)    Stage 3a chronic kidney disease  Stable kidney function. Followed by nephrology.      Vitamin D deficiency  -     vitamin D3 (CHOLECALCIFEROL) 50 mcg (2000 units) tablet; Take 1 tablet (50 mcg) by mouth daily      Need for prophylactic vaccination and inoculation against influenza  -     FLUZONE HIGH DOSE 65+  [06630]  -     ADMIN 1st VACCINE    Prescriptions for 6 months of medications sent to pharmacy, to apply travel override due to upcoming travel to [place].      BMI:   Estimated body mass index is 36.99 kg/m  as calculated from the following:    Height as of this encounter: 1.727 m (5' 8\").    Weight as of this encounter: 110.4 kg (243 lb 4.8 oz).   Weight management plan: Discussed healthy diet and exercise guidelines         Return in about 6 months (around 5/2/2021) for Med check.    Linda Gonzalez, APRN [facility]    "

## 2020-11-03 LAB
ANION GAP SERPL CALCULATED.3IONS-SCNC: 5 MMOL/L (ref 3–14)
BUN SERPL-MCNC: 17 MG/DL (ref 7–30)
CALCIUM SERPL-MCNC: 9.5 MG/DL (ref 8.5–10.1)
CHLORIDE SERPL-SCNC: 104 MMOL/L (ref 94–109)
CO2 SERPL-SCNC: 28 MMOL/L (ref 20–32)
CREAT SERPL-MCNC: 1.48 MG/DL (ref 0.66–1.25)
FERRITIN SERPL-MCNC: 167 NG/ML (ref 26–388)
GFR SERPL CREATININE-BSD FRML MDRD: 46 ML/MIN/{1.73_M2}
GLUCOSE SERPL-MCNC: 91 MG/DL (ref 70–99)
POTASSIUM SERPL-SCNC: 4.3 MMOL/L (ref 3.4–5.3)
SODIUM SERPL-SCNC: 137 MMOL/L (ref 133–144)
URATE SERPL-MCNC: 7.8 MG/DL (ref 3.5–7.2)

## 2020-12-02 ENCOUNTER — TELEPHONE (OUTPATIENT)
Dept: FAMILY MEDICINE | Facility: CLINIC | Age: 75
End: 2020-12-02

## 2020-12-02 DIAGNOSIS — Z11.52 ENCOUNTER FOR SCREENING LABORATORY TESTING FOR COVID-19 VIRUS: Primary | ICD-10-CM

## 2020-12-02 NOTE — TELEPHONE ENCOUNTER
Son, Estella, calling for his parents, they need COVID testing for traveling out of the country to [place]. Patient Is leaving 12/10/20. Will Linda put in orders? Please advise  [phone] - Estella  Thank you  Nasra Beverly

## 2020-12-03 NOTE — TELEPHONE ENCOUNTER
Order placed.   Additionally, patient may go to [facility] site to have saliva testing done if they are unable to get a relatively soon appt.    - Diana, CNP

## 2020-12-03 NOTE — TELEPHONE ENCOUNTER
Called son and gave him info.  He also needed an order for his mother but a message was not put in.  Sent message to last PCP to see pt.  Will call Estella back in regards to both messages when I hear back about the mother's orders.  Madelin Chris

## 2020-12-07 DIAGNOSIS — Z11.52 ENCOUNTER FOR SCREENING LABORATORY TESTING FOR COVID-19 VIRUS: ICD-10-CM

## 2020-12-07 PROCEDURE — U0003 INFECTIOUS AGENT DETECTION BY NUCLEIC ACID (DNA OR RNA); SEVERE ACUTE RESPIRATORY SYNDROME CORONAVIRUS 2 (SARS-COV-2) (CORONAVIRUS DISEASE [COVID-19]), AMPLIFIED PROBE TECHNIQUE, MAKING USE OF HIGH THROUGHPUT TECHNOLOGIES AS DESCRIBED BY CMS-2020-01-R: HCPCS | Performed by: FAMILY MEDICINE

## 2020-12-08 LAB
SARS-COV-2 RNA SPEC QL NAA+PROBE: NOT DETECTED
SPECIMEN SOURCE: NORMAL

## 2020-12-08 NOTE — RESULT ENCOUNTER NOTE
Dear Bharat,     COVID-19 testing was negative.      Please send a afterBOT message or call [phone]  if you have any questions.      Linda Gonzalez, MIHAELA, CNP  [facility]    If you have further questions about the interpretation of your labs, [email or web address] is a good website to check out for further information.

## 2021-02-16 ENCOUNTER — PATIENT OUTREACH (OUTPATIENT)
Dept: GERIATRIC MEDICINE | Facility: CLINIC | Age: 76
End: 2021-02-16

## 2021-02-16 NOTE — LETTER
February 19, 2021    LORETO PYLE  [address]  [place] [address]    Dear Loreto:     Your Care Coordinator has been unable to reach you by telephone. I am writing to ask you or a family member to call me at [phone]. If you reach my voicemail, please leave a message with your daytime telephone number and a date and time that I can call you. If you are hearing impaired, please call the [place] Relay at 301 or [phone] (gzlwfx-ee-ngcimq relay service).     The reason I am trying to reach you is:     [] Six (6) month check-in  [] To schedule your annual assessment  [x] Other: intial contact to schedule assessment.    Please call me as soon as you receive this letter. I look forward to speaking with you.    Sincerely,    RICHA Christopher, RN, PHN    E-mail: [email or web address]  Phone: [phone]      [facility]+ [id number] [place] ([id number])    [id number] (11/18)

## 2021-02-16 NOTE — PROGRESS NOTES
[facility] Care Coordination Contact    Member became effective with  Issa on 02/01/2021 with [facility] MSC+.  Previous Health Plan: [facility]  Previous Care System: n/a  Previous care coordinators name and number: n/a  Waiver Type: N/A  Last MMIS Entry: Date n/a   and Type n/a  MMIS visit date (and type) if different from above: n/a  Services Listed in MMIS:   UTF received: No: Requested on n/a Let me know if you feel there is a UTF needed.    Address discrepancy:  [facility]:  [phone] [address]  [facility] [place]:  [address], [place] (I used this one for SUSIE)    Trisha Zarco  Case Management Specialist  [facility]  [phone]

## 2021-02-16 NOTE — LETTER
February 16, 2021    LORETO PYLE  [address]  [place] [address]        Dear  Loreto,    Welcome to [facility] [place] [facility] ([facility] health program. My name is RICHA Christopher, RN, PHN. I am your MSC+ care coordinator.     I will call you soon to see how you are doing and determine what needs you may have. My job is to help connect you to services, complete an assessment, and develop a care plan with you. There is no charge to you for the care coordination and assessment services. Our goal is to keep you as healthy and independent as possible.     [facility] includes the benefits you may receive from Medical Assistance.    Soon, you will receive a new [facility] member identification (ID) card from [facility]. When you receive it, please use this card along with your [place] [facility] Programs card and Prescription Drug Coverage Program card. When you receive, it please use this card where you get your health services. If you have Medicare, you will need to show your Medicare card when you get health services.    If you have questions, please call me at [phone]. If you reach my voice mail, leave a message and your phone number. If you are hearing impaired, please call the [place] Relay at 716 or [phone] (blxald-st-silpeq relay service).    Sincerely,      RICHA Christopher, RN, PHN    E-mail: [email or web address]  Phone: [phone]      [facility]+ [id number] DHS Approved ([id number])  [id number] (11/18)

## 2021-02-19 NOTE — PROGRESS NOTES
"[facility] Care Coordination Contact    Per CC, mailed client an \"Unable to Contact\" letter.    Deysi Ritter  Care Management Specialist  [facility]  [phone]   "

## 2021-02-23 NOTE — PROGRESS NOTES
[facility] Care Coordination Contact    Called member to schedule annual HRA assessment. Left a message requesting a return call to schedule HRA.   Ema Carrillo RN BSN PHN      [facility]  Care Coordinator  Phone:   [phone]  Fax:       [phone]

## 2021-02-24 NOTE — PROGRESS NOTES
[facility] Care Coordination Contact    Called member to schedule annual HRA home visit. Left a message requesting a return call to schedule HRA.   Ema Carrillo RN BSN PHN      [facility]  Care Coordinator  Phone:   [phone]  Fax:       [phone]

## 2021-02-26 ENCOUNTER — PATIENT OUTREACH (OUTPATIENT)
Dept: GERIATRIC MEDICINE | Facility: CLINIC | Age: 76
End: 2021-02-26

## 2021-02-26 NOTE — PROGRESS NOTES
[facility] Care Coordination Contact      [facility] Refusal Telephone Assessment    Member refused home visit HRA on 02/26/2021 (reason: member not interested at this time).    ER visits: No  Hospitalizations: No  Health concerns: n/a  Falls/Injuries: No       Member currently receiving the following home care services:   N/A  Member currently receiving the following community resources:  N/A  Informal support(s):  Children, lives with spouse    Advanced Care Planning discussion, complete code section.    [facility] sponsored benefits: Shared information re: Silver Sneakers/gym memberships, ASA, Calcium +D.    Follow-Up Plan: Member informed of future contact, plan to f/u with member with a 6 month telephone assessment and offer a home visit.  Contact information shared with member and family, encouraged member to call with any questions or concerns at any time.    Ema Carrillo RN BSN PHN      [facility]  Care Coordinator  Phone:   [phone]  Fax:       [phone]

## 2021-04-24 ENCOUNTER — HEALTH MAINTENANCE LETTER (OUTPATIENT)
Age: 76
End: 2021-04-24

## 2021-08-24 NOTE — PROGRESS NOTES
"    Assessment & Plan     Benign essential hypertension  Longstanding history of hypertension  He has not been taking his medications for the last 1 month because he ran out of them  They do check their blood pressure at home about once a day on average and his readings are currently running around 150-160 systolic over 100 diastolic  Those are the readings I even got here  Emphasized about medication compliance  Continue to monitor blood pressure at home and contact me in the 4 weeks with readings to see if we need to change any of his medications  - Albumin Random Urine Quantitative with Creat Ratio; Future  - amLODIPine (NORVASC) 10 MG tablet; Take 1 tablet (10 mg) by mouth daily  - carvedilol (COREG) 12.5 MG tablet; Take 1 tablet (12.5 mg) by mouth 2 times daily (with meals)  - lisinopril (ZESTRIL) 40 MG tablet; Take 1 tablet (40 mg) by mouth daily  - Albumin Random Urine Quantitative with Creat Ratio    Stage 3a chronic kidney disease  Discussed about diet for chronic kidney disease and also asked them to closely monitor the blood pressure  - Basic metabolic panel  (Ca, Cl, CO2, Creat, Gluc, K, Na, BUN); Future  - Basic metabolic panel  (Ca, Cl, CO2, Creat, Gluc, K, Na, BUN)             BMI:   Estimated body mass index is 37.96 kg/m  as calculated from the following:    Height as of this encounter: 1.71 m (5' 7.32\").    Weight as of this encounter: 111 kg (244 lb 11.2 oz).   Weight management plan: Discussed healthy diet and exercise guidelines        Return in about 4 weeks (around 9/22/2021).    Rl Figueroa MD  [facility]    Dimitris Nicholson is a 76 year old who presents for the following health issues     History of Present Illness       Hypertension: He presents for follow up of hypertension.  He does check blood pressure  regularly outside of the clinic. Outside blood pressures have been over 140/90. He follows a low salt diet.     He eats 2-3 servings of fruits and vegetables " "daily.He consumes 0 sweetened beverage(s) daily.He exercises with enough effort to increase his heart rate 10 to 19 minutes per day.  He exercises with enough effort to increase his heart rate 3 or less days per week.   He is taking medications regularly.             Review of Systems   Constitutional, HEENT, cardiovascular, pulmonary, gi and gu systems are negative, except as otherwise noted.      Objective    BP (!) 160/105 (BP Location: Right arm, Patient Position: Sitting, Cuff Size: Adult Large)   Pulse 88   Temp 97.6  F (36.4  C) (Tympanic)   Resp 20   Ht 1.71 m (5' 7.32\")   Wt 111 kg (244 lb 11.2 oz)   SpO2 98%   BMI 37.96 kg/m    Body mass index is 37.96 kg/m .  Physical Exam   GENERAL: healthy, alert and no distress  EYES: Eyes grossly normal to inspection, PERRL and conjunctivae and sclerae normal  NECK: no adenopathy, no asymmetry, masses, or scars and thyroid normal to palpation  RESP: lungs clear to auscultation - no rales, rhonchi or wheezes  CV: regular rate and rhythm, normal S1 S2, no S3 or S4, no murmur, click or rub, no peripheral edema and peripheral pulses strong  ABDOMEN: soft, nontender, no hepatosplenomegaly, no masses and bowel sounds normal  MS: no gross musculoskeletal defects noted, no edema  SKIN: no suspicious lesions or rashes  NEURO: Normal strength and tone, mentation intact and speech normal  PSYCH: mentation appears normal, affect normal/bright                "

## 2021-08-25 ENCOUNTER — OFFICE VISIT (OUTPATIENT)
Dept: FAMILY MEDICINE | Facility: CLINIC | Age: 76
End: 2021-08-25
Payer: COMMERCIAL

## 2021-08-25 VITALS
RESPIRATION RATE: 20 BRPM | WEIGHT: 244.7 LBS | SYSTOLIC BLOOD PRESSURE: 160 MMHG | HEART RATE: 88 BPM | OXYGEN SATURATION: 98 % | TEMPERATURE: 97.6 F | HEIGHT: 67 IN | DIASTOLIC BLOOD PRESSURE: 105 MMHG | BODY MASS INDEX: 38.4 KG/M2

## 2021-08-25 DIAGNOSIS — N18.31 STAGE 3A CHRONIC KIDNEY DISEASE (H): ICD-10-CM

## 2021-08-25 DIAGNOSIS — I10 BENIGN ESSENTIAL HYPERTENSION: Primary | ICD-10-CM

## 2021-08-25 DIAGNOSIS — R80.9 POSITIVE FOR MACROALBUMINURIA: ICD-10-CM

## 2021-08-25 LAB
ANION GAP SERPL CALCULATED.3IONS-SCNC: 4 MMOL/L (ref 3–14)
BUN SERPL-MCNC: 22 MG/DL (ref 7–30)
CALCIUM SERPL-MCNC: 9.9 MG/DL (ref 8.5–10.1)
CHLORIDE BLD-SCNC: 105 MMOL/L (ref 94–109)
CO2 SERPL-SCNC: 29 MMOL/L (ref 20–32)
CREAT SERPL-MCNC: 1.58 MG/DL (ref 0.66–1.25)
GFR SERPL CREATININE-BSD FRML MDRD: 42 ML/MIN/1.73M2
GLUCOSE BLD-MCNC: 94 MG/DL (ref 70–99)
POTASSIUM BLD-SCNC: 4.9 MMOL/L (ref 3.4–5.3)
SODIUM SERPL-SCNC: 138 MMOL/L (ref 133–144)

## 2021-08-25 PROCEDURE — 80048 BASIC METABOLIC PNL TOTAL CA: CPT | Performed by: INTERNAL MEDICINE

## 2021-08-25 PROCEDURE — 82043 UR ALBUMIN QUANTITATIVE: CPT | Performed by: INTERNAL MEDICINE

## 2021-08-25 PROCEDURE — 99214 OFFICE O/P EST MOD 30 MIN: CPT | Performed by: INTERNAL MEDICINE

## 2021-08-25 PROCEDURE — 36415 COLL VENOUS BLD VENIPUNCTURE: CPT | Performed by: INTERNAL MEDICINE

## 2021-08-25 RX ORDER — LISINOPRIL 40 MG/1
40 TABLET ORAL DAILY
Qty: 180 TABLET | Refills: 1 | Status: SHIPPED | OUTPATIENT
Start: 2021-08-25 | End: 2022-07-13

## 2021-08-25 RX ORDER — AMLODIPINE BESYLATE 10 MG/1
10 TABLET ORAL DAILY
Qty: 180 TABLET | Refills: 1 | Status: SHIPPED | OUTPATIENT
Start: 2021-08-25 | End: 2022-10-07

## 2021-08-25 RX ORDER — CARVEDILOL 12.5 MG/1
12.5 TABLET ORAL 2 TIMES DAILY WITH MEALS
Qty: 360 TABLET | Refills: 1 | Status: SHIPPED | OUTPATIENT
Start: 2021-08-25 | End: 2021-11-29

## 2021-08-25 ASSESSMENT — MIFFLIN-ST. JEOR: SCORE: 1813.7

## 2021-08-25 NOTE — PATIENT INSTRUCTIONS
Patient Education     Diet for Chronic Kidney Disease   Following a special diet when you have kidney disease can help you stay as healthy as possible. Your healthcare provider or dietitian should make a special diet plan just for you.    Eating right  Here are some good eating rules to follow:    Protein. Eating protein is important for your body. But too much protein can put a strain on your kidneys. Eating less protein may slow the progression of chronic kidney disease. Foods high in protein include meat, fish, eggs, cheese, and other dairy products. A registered dietitian can help you plan a diet that has the right amount of protein for you.    Sodium. Having too much salt in your diet can make your body hold onto (retain) water. Ask your provider or dietitian how much sodium per day you are allowed. This will help you prevent fluid buildup in your body (fluid retention). It can also help control high blood pressure. Learn to read food labels to know how much sodium is in one serving. Foods high in salt include processed meats, canned and boxed foods, sauces, salted chips and snacks, pickled foods, frozen dinners, and restaurant and fast food.    Fluids. If you have advanced kidney disease, you will need to limit the water and fluids you drink. If you don t, then too much water will build up in your body. The exact amount of fluid you can drink depends on how well your kidneys are working. Ask your provider how much water you can safely drink each day.    Potassium. In advanced kidney disease, your potassium level can get dangerously high. This affects your heart. It can cause an irregular heartbeat (arrhythmia). Ask your provider or dietitian if you should limit potassium in your diet. Foods high in potassium include dairy products (milk, yogurt, cheese), dried beans, bananas, oranges, potatoes, tomatoes, spinach, cantaloupe, honeydew melon, dried fruits, and nuts. Some salt substitutes also contain  potassium, so be sure to read the label before using.    Calcium. Calcium is important to build strong bones. But foods high in calcium are also high in phosphorus, which can take calcium from your bones. Limiting foods high in phosphorus will help keep calcium in your bones. Ask your provider how much calcium you should get each day.    Phosphorus. In advanced kidney disease, your phosphorus level can get dangerously high. This affects many systems in the body and can damage your heart. Limit your intake of phosphorus-rich foods. These include dried beans and peas, nuts, peanut butter, cocoa, beer, cola drinks, and dairy products.  [employer] last reviewed this educational content on 10/1/2019    8187-7651 [employer]. All rights reserved. This information is not intended as a substitute for professional medical care. Always follow your healthcare professional's instructions.

## 2021-08-26 LAB
CREAT UR-MCNC: 48 MG/DL
MICROALBUMIN UR-MCNC: 370 MG/L
MICROALBUMIN/CREAT UR: 770.83 MG/G CR (ref 0–17)

## 2021-08-26 NOTE — RESULT ENCOUNTER NOTE
Dear Bharat Figueroa is currently out of the office and I am reviewing your results.  Your urine shows excess protein. Your blood pressure has adversely affected your kidneys. Your kidney function is a bit worse than previous.  Continue with the plan developed with Dr. Figueroa.  Please call or MyChart my office with any questions or concerns.   Rupa Barron, PAC

## 2021-08-27 PROBLEM — R80.9 POSITIVE FOR MACROALBUMINURIA: Status: ACTIVE | Noted: 2021-08-27

## 2021-09-23 ENCOUNTER — PATIENT OUTREACH (OUTPATIENT)
Dept: GERIATRIC MEDICINE | Facility: CLINIC | Age: 76
End: 2021-09-23

## 2021-09-23 NOTE — PROGRESS NOTES
[facility] Care Coordination Contact    Called member to complete six month assessment and left a message requesting a return call.  Ema Carrillo RN BSN PHN      [facility]  Care Coordinator  Phone:   [phone]  Fax:       [phone]

## 2021-09-23 NOTE — PROGRESS NOTES
"[facility] Care Coordination Contact    Per CC, mailed client an \"Unable to Contact\" letter for 6 month assessment.     Nori Vazquez  Care Management Specialist  [facility]  [phone]      "

## 2021-09-23 NOTE — LETTER
September 23, 2021    LORETO PYLE  [address]  [place] [facility] [address]    Dear Loreto:     Your Care Coordinator has been unable to reach you by telephone.I am writing to ask you or an authorized representative to call me at [phone]. If you reach my voicemail, please leave a message with your daytime telephone number and a date and time that I can call you. If you are hearing impaired, please call the [place] Relay at 068 or [phone] (scvcpr-kr-lcpfjb relay service).     The reason I am trying to reach you is:     [x] Six (6) month check-in  [] To schedule your annual assessment  [] Other:      Please call me as soon as you receive this letter. I look forward to speaking with you.    Sincerely,    RICHA Christopher, RN, PHN    E-mail: [email or web address]  Phone: [phone]      [facility]+ [id number] DHS Approved([id number])    [id number] (2/19)

## 2021-09-28 NOTE — PROGRESS NOTES
Dimitris Nicholson is a 76 year old who presents for the following health issues  accompanied by his son.    History of Present Illness       Hypertension: He presents for follow up of hypertension.  He does check blood pressure  regularly outside of the clinic. Outpatient blood pressures have not been over 140/90. He follows a low salt diet.     He eats 2-3 servings of fruits and vegetables daily.He consumes 0 sweetened beverage(s) daily.He exercises with enough effort to increase his heart rate 20 to 29 minutes per day.  He exercises with enough effort to increase his heart rate 5 days per week.   He is taking medications regularly.           Review of Systems         Objective    There were no vitals taken for this visit.  There is no height or weight on file to calculate BMI.  Physical Exam   {Exam List (Optional):780762}    {Diagnostic Test Results (Optional):466398}    {AMBULATORY ATTESTATION (Optional):002282}

## 2021-09-29 ENCOUNTER — OFFICE VISIT (OUTPATIENT)
Dept: FAMILY MEDICINE | Facility: CLINIC | Age: 76
End: 2021-09-29
Payer: COMMERCIAL

## 2021-09-29 VITALS
BODY MASS INDEX: 36.37 KG/M2 | RESPIRATION RATE: 18 BRPM | DIASTOLIC BLOOD PRESSURE: 90 MMHG | WEIGHT: 240 LBS | OXYGEN SATURATION: 100 % | HEART RATE: 89 BPM | HEIGHT: 68 IN | SYSTOLIC BLOOD PRESSURE: 149 MMHG | TEMPERATURE: 98.6 F

## 2021-09-29 DIAGNOSIS — E66.01 MORBID OBESITY (H): ICD-10-CM

## 2021-09-29 DIAGNOSIS — N18.31 STAGE 3A CHRONIC KIDNEY DISEASE (H): Primary | ICD-10-CM

## 2021-09-29 DIAGNOSIS — R80.9 POSITIVE FOR MACROALBUMINURIA: ICD-10-CM

## 2021-09-29 DIAGNOSIS — I10 BENIGN ESSENTIAL HYPERTENSION: ICD-10-CM

## 2021-09-29 LAB
ANION GAP SERPL CALCULATED.3IONS-SCNC: 1 MMOL/L (ref 3–14)
BUN SERPL-MCNC: 27 MG/DL (ref 7–30)
CALCIUM SERPL-MCNC: 10.1 MG/DL (ref 8.5–10.1)
CHLORIDE BLD-SCNC: 102 MMOL/L (ref 94–109)
CO2 SERPL-SCNC: 29 MMOL/L (ref 20–32)
CREAT SERPL-MCNC: 1.7 MG/DL (ref 0.66–1.25)
CREAT UR-MCNC: 107 MG/DL
GFR SERPL CREATININE-BSD FRML MDRD: 39 ML/MIN/1.73M2
GLUCOSE BLD-MCNC: 96 MG/DL (ref 70–99)
MICROALBUMIN UR-MCNC: 33 MG/L
MICROALBUMIN/CREAT UR: 30.84 MG/G CR (ref 0–17)
POTASSIUM BLD-SCNC: 5.3 MMOL/L (ref 3.4–5.3)
SODIUM SERPL-SCNC: 132 MMOL/L (ref 133–144)

## 2021-09-29 PROCEDURE — 36415 COLL VENOUS BLD VENIPUNCTURE: CPT | Performed by: INTERNAL MEDICINE

## 2021-09-29 PROCEDURE — 82043 UR ALBUMIN QUANTITATIVE: CPT | Performed by: INTERNAL MEDICINE

## 2021-09-29 PROCEDURE — 99214 OFFICE O/P EST MOD 30 MIN: CPT | Performed by: INTERNAL MEDICINE

## 2021-09-29 PROCEDURE — 80048 BASIC METABOLIC PNL TOTAL CA: CPT | Performed by: INTERNAL MEDICINE

## 2021-09-29 ASSESSMENT — MIFFLIN-ST. JEOR: SCORE: 1803.13

## 2021-09-29 NOTE — PROGRESS NOTES
"    Assessment & Plan     Stage 3a chronic kidney disease  Emphasized about blood pressure control  They showed me the home readings  Home readings are around 140 systolic and 80 diastolic  If readings continue to be above 140 systolic during the next visit I can add some diuretic  Baseline creatinine around 1.4-1.5  - Basic metabolic panel  (Ca, Cl, CO2, Creat, Gluc, K, Na, BUN); Future  - Albumin Random Urine Quantitative with Creat Ratio; Future  - Albumin Random Urine Quantitative with Creat Ratio  - Basic metabolic panel  (Ca, Cl, CO2, Creat, Gluc, K, Na, BUN)    Positive for macroalbuminuria  Recheck proteinuria today  - Basic metabolic panel  (Ca, Cl, CO2, Creat, Gluc, K, Na, BUN); Future  - Albumin Random Urine Quantitative with Creat Ratio; Future  - Albumin Random Urine Quantitative with Creat Ratio  - Basic metabolic panel  (Ca, Cl, CO2, Creat, Gluc, K, Na, BUN)    Benign essential hypertension  As above he is on 3 antihypertensives  Not sure why he was placed on Coreg but will continue this for now    Morbid obesity (H)  Discussed about diet and exercise        25 minutes spent on the date of the encounter doing chart review, history and exam, documentation and further activities per the note           Return in about 3 months (around 12/29/2021).    Rl Figueroa MD  [facility]    Dimitris Nicholson is a 76 year old who presents for the following health issues     HPI   Here for follow-up of his blood pressure and chronic kidney disease          Review of Systems   Constitutional, HEENT, cardiovascular, pulmonary, gi and gu systems are negative, except as otherwise noted.      Objective    BP (!) 149/90   Pulse 89   Temp 98.6  F (37  C) (Tympanic)   Resp 18   Ht 1.727 m (5' 8\")   Wt 108.9 kg (240 lb)   SpO2 100%   BMI 36.49 kg/m    Body mass index is 36.49 kg/m .  Physical Exam   GENERAL: healthy, alert and no distress  EYES: Eyes grossly normal to inspection, PERRL and " conjunctivae and sclerae normal  RESP: lungs clear to auscultation - no rales, rhonchi or wheezes  CV: regular rate and rhythm, normal S1 S2, no S3 or S4, no murmur, click or rub, no peripheral edema and peripheral pulses strong  ABDOMEN: soft, nontender, no hepatosplenomegaly, no masses and bowel sounds normal  MS: no gross musculoskeletal defects noted, no edema  SKIN: no suspicious lesions or rashes  NEURO: Normal strength and tone, mentation intact and speech normal  PSYCH: mentation appears normal, affect normal/bright

## 2021-09-29 NOTE — PATIENT INSTRUCTIONS
Patient Education     Chronic Kidney Disease (CKD)   The role of the kidneys is to remove waste products and extra water from the blood. When the kidneys don't work as they should, waste products start to build up in the blood. This is called chronic kidney disease (CKD). CKD means that you have kidney damage or a decrease in kidney function lasting at least 3 months. CKD allows extra water, waste, and toxins to build up in the body. This can eventually become life-threatening. You might need dialysis or a kidney transplant to stay alive. This most severe form is called end stage renal disease.  Diabetes is the leading causes of chronic renal failure. Other causes include high blood pressure, hardening of the arteries (atherosclerosis), lupus, inflammation of the blood vessels (vasculitis), and past viral or bacterial infections. Certain over-the-counter pain medicines can cause renal failure when taken often over a long period of time. These include aspirin, ibuprofen, and related anti-inflammatory medicines called NSAIDs (nonsteroidal anti-inflammatory drugs).  Home care  The following guidelines will help you care for yourself at home:    If you have diabetes, talk with your healthcare provider about keeping your blood sugar under control. Ask if you need to make and changes to your diet, lifestyle, or medicines.    If you have high blood pressure:  ? Take prescribed medicine to lower your blood pressure to the recommended goal of less than 130/80.  ? Start a regular exercise program that you enjoy. Check with your healthcare provider to be sure your planned exercise program is right for you.  ? Eat less salt (sodium). Your healthcare provider can tell you how much salt per day is safe for you.    If you are overweight, talk with your healthcare provider about a weight loss plan.    If you smoke, you must quit. Smoking makes kidney disease worse and puts you at risk for developing other serious  illnesses. Talk with your healthcare provider about ways to help you quit.  For more information, visit the following links:  ? www.smokefree.gov/sites/default/files/pdf/clearing-the-air-accessible.pdf  ? www.smokefree.gov  ? www.cancer.org/healthy/stayawayfromtobacco/guidetoquittingsmoking/    Most people with CKD need to follow a special diet.  Be sure you understand yours. In general, you will need to limit protein, salt, potassium, and phosphorus. You also need to limit how much fluid you drink.     CKD is a risk factor for heart disease. Talk with your healthcare provider about any other risk factors you might have and what you can do to lessen them.    Talk with your healthcare provider about any medicines you are taking to find out if they need to be reduced or stopped.    For your own safety, check with your doctor before taking any medicines or supplements. Don't use the following over-the-counter medicines. Or consult your healthcare provider before using them:  ? Aspirin and NSAIDs such as ibuprofen or naproxen. Using acetaminophen for fever or pain is OK.  ? Laxatives and antacids containing magnesium or aluminum  ? Fleet or phospho soda enemas containing phosphorus  ? Certain stomach acid-blocking medicine such as cimetidine or ranitidine   ? Decongestants containing pseudoephedrine   ? Herbal supplements  Follow-up care  Follow up with your healthcare provider, or as advised. Contact one of the following for more information:    American Association of Kidney Patients www.aakp.org    National Kidney Foundation www.kidney.org    American Kidney Fund www.kidneyfund.org    National Kidney Disease Education Program www.nkdep.nih.gov  If an X-ray, ECG (cardiogram), or other diagnostic test was taken, you will be told of any new findings that may affect your care.  Call 911  Call 911 if you have any of the following:    Severe weakness, dizziness, fainting, drowsiness, or confusion    Chest pain or shortness  of breath    Heart beating fast, slow, or irregularly  When to seek medical advice  Call your healthcare provider right away if any of these occur:    Nausea or vomiting    Fever of 100.4 F (38 C) or higher, or as directed by your healthcare provider    Unexpected weight gain or swelling in the legs, ankles, or around the eyes    Decrease or absent urine output    New symptoms or symptoms that get worse  [employer] last reviewed this educational content on 10/1/2019    4673-0120 [employer]. All rights reserved. This information is not intended as a substitute for professional medical care. Always follow your healthcare professional's instructions.

## 2021-10-09 ENCOUNTER — HEALTH MAINTENANCE LETTER (OUTPATIENT)
Age: 76
End: 2021-10-09

## 2021-10-29 NOTE — PROGRESS NOTES
[facility] Care Coordination Contact      [facility] Six-Month Telephone Assessment    6 month telephone assessment completed on 10/29/2021.    ER visits: No  Hospitalizations: No  TCU stays: No  Significant health status changes: n/a  Falls/Injuries: No  ADL/IADL changes: No  Changes in services: No    Caregiver Assessment follow up:  n/a    Goals: See POC in chart for goal progress documentation.      Will see member in 6 months for an annual health risk assessment.   Encouraged member to call CC with any questions or concerns in the meantime.     Ema Carrillo RN BSN PHN      [facility]  Care Coordinator  Phone:   [phone]  Fax:       [phone]

## 2021-11-29 ENCOUNTER — VIRTUAL VISIT (OUTPATIENT)
Dept: FAMILY MEDICINE | Facility: CLINIC | Age: 76
End: 2021-11-29
Payer: COMMERCIAL

## 2021-11-29 VITALS — SYSTOLIC BLOOD PRESSURE: 135 MMHG | DIASTOLIC BLOOD PRESSURE: 82 MMHG

## 2021-11-29 DIAGNOSIS — I10 BENIGN ESSENTIAL HYPERTENSION: Primary | ICD-10-CM

## 2021-11-29 DIAGNOSIS — N18.31 STAGE 3A CHRONIC KIDNEY DISEASE (H): ICD-10-CM

## 2021-11-29 PROCEDURE — 99214 OFFICE O/P EST MOD 30 MIN: CPT | Mod: 95 | Performed by: INTERNAL MEDICINE

## 2021-11-29 RX ORDER — CARVEDILOL 25 MG/1
25 TABLET ORAL 2 TIMES DAILY WITH MEALS
Qty: 120 TABLET | Refills: 1 | Status: SHIPPED | OUTPATIENT
Start: 2021-11-29 | End: 2022-11-18

## 2021-11-29 NOTE — PROGRESS NOTES
Bharat is a 76 year old who is being evaluated via a billable video visit.      How would you like to obtain your AVS? MyChart  If the video visit is dropped, the invitation should be resent by: Text to cell phone: [phone]  Will anyone else be joining your video visit? No      Video Start Time: 300 PM    Assessment & Plan     Benign essential hypertension  He continues to monitor his blood pressure at home  Readings are for the most part in the range of 130 systolic and below 90 diastolic  Readings never go above 140 systolic except very occasionally  He is currently on Coreg 12.5 mg twice a day along with lisinopril 40 mg and amlodipine 10 mg  For reasons that remain unclear he was never started on a diuretic  We will increase the Coreg to 25 mg twice a day since his pulse has been only in the 70s at home  I discussed about this increased dose and explained this to his daughter and also to the patient  I told him to finish the current prescription by taking 2 tablets twice a day of 12.5 mg and once they are done with the prescription to  the new prescription  I reviewed the blood pressure to be consistently below 130 systolic and 80 diastolic because of his CKD  We will evaluate again in 3 months  At that point if his pressures are still elevated and not at target we can add hydrochlorothiazide but I think with this new medication change the pressures will be better controlled and we will have them at goal  - carvedilol (COREG) 25 MG tablet; Take 1 tablet (25 mg) by mouth 2 times daily (with meals)    Stage 3a chronic kidney disease (H)  I will start the process of referral to nephrology if the GFR falls below 30        25 minutes spent on the date of the encounter doing chart review, history and exam, documentation and further activities per the note           No follow-ups on file.    Rl Figueroa MD  [facility]   Bharat is a 76 year old who presents for the  following health issues     HPI   Here for follow-up of his blood pressures  He is doing fine  Exercising regularly  Blood pressures have been under good control          Review of Systems   Constitutional, HEENT, cardiovascular, pulmonary, gi and gu systems are negative, except as otherwise noted.      Objective           Vitals:  No vitals were obtained today due to virtual visit.    Physical Exam   GENERAL: Healthy, alert and no distress  EYES: Eyes grossly normal to inspection.  No discharge or erythema, or obvious scleral/conjunctival abnormalities.  RESP: No audible wheeze, cough, or visible cyanosis.  No visible retractions or increased work of breathing.    SKIN: Visible skin clear. No significant rash, abnormal pigmentation or lesions.  NEURO: Cranial nerves grossly intact.  Mentation and speech appropriate for age.  PSYCH: Mentation appears normal, affect normal/bright, judgement and insight intact, normal speech and appearance well-groomed.                Video-Visit Details    Type of service:  Video Visit    Video End Time:320PM    Originating Location (pt. Location): Home    Distant Location (provider location):  [facility]     Platform used for Video Visit: Phuc

## 2022-01-14 ENCOUNTER — PATIENT OUTREACH (OUTPATIENT)
Dept: GERIATRIC MEDICINE | Facility: CLINIC | Age: 77
End: 2022-01-14

## 2022-01-14 NOTE — LETTER
January 17, 2022    LORETO PYLE  [address]  [place] [address]    Dear Loreto:     Your Care Coordinator has been unable to reach you by telephone.I am writing to ask you or an authorized representative to call me at [phone]. If you reach my voicemail, please leave a message with your daytime telephone number and a date and time that I can call you. If you are hearing impaired, please call the [place] Relay at 888 or [phone] (bzihad-jb-hsgfrn relay service).     The reason I am trying to reach you is:     [] Six (6) month check-in  [x] To schedule your annual assessment  [] Other:      Please call me as soon as you receive this letter. I look forward to speaking with you.    Sincerely,    RICHA Christopher, RN, PHN    E-mail: [email or web address]  Phone: [phone]      [facility] [id number] DHS Approved([id number])    [id number] (2/19)

## 2022-01-15 NOTE — PROGRESS NOTES
[facility] Care Coordination Contact    Called member to schedule annual HRA telephonic visit. Left a message requesting a return call to schedule HRA.   Ema Carrillo RN BSN PHN      [facility]  Care Coordinator  Phone:   [phone]  Fax:       [phone]

## 2022-01-17 NOTE — PROGRESS NOTES
"[facility] Care Coordination Contact    Per CC, mailed client an \"Unable to Contact\" letter.    Nori Vazquez  Care Management Specialist  [facility]  [phone]      "

## 2022-01-18 ENCOUNTER — PATIENT OUTREACH (OUTPATIENT)
Dept: GERIATRIC MEDICINE | Facility: CLINIC | Age: 77
End: 2022-01-18

## 2022-01-18 NOTE — PROGRESS NOTES
[facility] Care Coordination Contact    Called member to schedule annual HRA telephonic visit. Left a message requesting a return call to schedule HRA.   Ema Carrillo RN BSN PHN      [facility]  Care Coordinator  Phone:   [phone]  Fax:       [phone]

## 2022-01-19 NOTE — PROGRESS NOTES
[facility] Care Coordination Contact    Called member to schedule annual HRA telephonic visit. Left a message requesting a return call to schedule HRA.   Ema Carrillo RN BSN PHN      [facility]  Care Coordinator  Phone:   [phone]  Fax:       [phone]

## 2022-01-26 ENCOUNTER — PATIENT OUTREACH (OUTPATIENT)
Dept: GERIATRIC MEDICINE | Facility: CLINIC | Age: 77
End: 2022-01-26

## 2022-01-26 NOTE — PROGRESS NOTES
[facility] Care Coordination Contact    Completed 4 attempts to reach client with no response.  Member is officially unable to contact effective today.  Completed MMIS entry.  Completed health plan required [facility] POC.    Follow-up Plan: CC will attempt to reach member in six months.    Ema Carrillo RN BSN PHN      [facility]  Care Coordinator  Phone:   [phone]  Fax:       [phone]

## 2022-04-26 NOTE — TELEPHONE ENCOUNTER
Panel Management Review      Patient has the following on his problem list:     Hypertension   Last three blood pressure readings:  BP Readings from Last 3 Encounters:   05/25/17 138/86   05/12/17 (!) 152/98   04/14/17 (!) 142/98     Blood pressure: MONITOR    HTN Guidelines:  Age 18-59 BP range:  Less than 140/90  Age 60-85 with Diabetes:  Less than 140/90  Age 60-85 without Diabetes:  less than 150/90      Composite cancer screening  Chart review shows that this patient is due/due soon for the following Colonoscopy  Summary:    Patient is due/failing the following:   COLONOSCOPY    Action needed:   Patient needs referral/order: Colonoscopy.    Type of outreach:    Sent letter.    Questions for provider review:    Please place order for colonoscopy and fasting lipids.                                                                                                                                    Geri Roman LPN     Chart routed to Provider .         No

## 2022-05-21 ENCOUNTER — HEALTH MAINTENANCE LETTER (OUTPATIENT)
Age: 77
End: 2022-05-21

## 2022-07-20 ENCOUNTER — PATIENT OUTREACH (OUTPATIENT)
Dept: GERIATRIC MEDICINE | Facility: CLINIC | Age: 77
End: 2022-07-20

## 2022-07-20 NOTE — PROGRESS NOTES
[facility] Care Coordination Contact      [facility] Six-Month Telephone Assessment    6 month telephone assessment completed on 07/20/2022.  Spoke to Filipe, son who speaks English, member speaks Hausa.  ER visits: No  Hospitalizations: No  TCU stays: No  Significant health status changes: n/a  Falls/Injuries: No  ADL/IADL changes: No  Changes in services: No    Caregiver Assessment follow up:  n/a    Goals: See POC in chart for goal progress documentation.      Will see member in 6 months for an annual health risk assessment.   Encouraged member to call CC with any questions or concerns in the meantime.     Ema Carrillo RN BSN PHN      [facility]  Care Coordinator  Phone:   [phone]  Fax:       [phone]

## 2022-08-26 ENCOUNTER — PATIENT OUTREACH (OUTPATIENT)
Dept: GERIATRIC MEDICINE | Facility: CLINIC | Age: 77
End: 2022-08-26

## 2022-08-26 NOTE — PROGRESS NOTES
[facility] Care Coordination Contact    No call to member.  [employer] Regulatory Update.  Ema Carrillo RN BSN PHN      [facility]  Care Coordinator  Phone:   [phone]  Fax:       [phone]

## 2022-09-11 ENCOUNTER — HEALTH MAINTENANCE LETTER (OUTPATIENT)
Age: 77
End: 2022-09-11

## 2022-11-18 ENCOUNTER — PATIENT OUTREACH (OUTPATIENT)
Dept: GERIATRIC MEDICINE | Facility: CLINIC | Age: 77
End: 2022-11-18

## 2022-11-18 NOTE — PROGRESS NOTES
[facility] Care Coordination Contact    Encounter opened due to Regulatory [employer] Update to open FVP Program.    Nori Vazquez  Care Management Specialist  [facility]  [phone]

## 2022-11-18 NOTE — PROGRESS NOTES
[facility] Care Coordination Contact    Encounter opened due to Regulatory [employer] Update to close FVP Program.    Nori Vazquez  Care Management Specialist  [facility]  [phone]

## 2022-12-02 ENCOUNTER — PATIENT OUTREACH (OUTPATIENT)
Dept: GERIATRIC MEDICINE | Facility: CLINIC | Age: 77
End: 2022-12-02

## 2022-12-02 NOTE — LETTER
December 5, 2022    LORETO PYLE  [address]  [place] [address]        Dear Loreto:    As a member of [facility]'s MSC+ program, we offer a health risk assessment at no cost to you. I know you don't want to have the assessment right now. If you change your mind, please call me at the number below.    Who performs the health risk assessment?  A [facility] Care Coordinator performs the assessment. Our Care Coordinators can also help you understand your benefits. They can tell you about services to aid you at home, such as managing your care with your doctors if your health worsens.    Our Care Coordinators are here for you if you need:    Support for activities you used to do by yourself (including making meals, bathing and paying bills)    Equipment for bathroom or home safety    Help finding a new place to live    Information on staying healthy, preventing falls and immunizations    Questions?  If you have questions, or you would like to do he assessment, call me at [phone]. TTY users call [phone]. I'm here from 8am to 5pm. I may reach out to you again soon.       Sincerely,         RICHA Christopher, RN, PHN    E-mail: [email or web address]  Phone: [phone]      [facility] [employer]      \<[id number] accepted  [id number]>    [id number] (21/2021)

## 2022-12-02 NOTE — PROGRESS NOTES
[facility] Care Coordination Contact      [facility] Refusal Telephone Assessment    Member refused home visit HRA on 12/02/2022 (reason: not interested at this time).    ER visits: No  Hospitalizations: No  Health concerns: n/a  Falls/Injuries: No  ADL/IADL Dependencies: n/a        Member currently receiving the following home care services:   N/A  Member currently receiving the following community resources:  N/A  Informal support(s):  Spouse, children    Advanced Care Planning discussion, complete code section.    [facility] sponsored benefits: Shared information re: Silver Sneakers/gym memberships, ASA, Calcium +D.    Follow-Up Plan: Member informed of future contact, plan to f/u with member with a 6 month telephone assessment and offer a home visit.  Contact information shared with member and family, encouraged member to call with any questions or concerns at any time.    This CC note routed to PCP.    Ema Carrillo RN BSN PHN      [facility]  Care Coordinator  Phone:   [phone]  Fax:       [phone]

## 2022-12-05 NOTE — PROGRESS NOTES
"[facility] Care Coordination Contact    Per CC, mailed client a \"Refusal of Home Visit\" letter.    Nori Vazquez  Care Management Specialist  [facility]  [phone]      "

## 2022-12-14 ENCOUNTER — TELEPHONE (OUTPATIENT)
Dept: FAMILY MEDICINE | Facility: CLINIC | Age: 77
End: 2022-12-14

## 2023-01-27 ENCOUNTER — OFFICE VISIT (OUTPATIENT)
Dept: FAMILY MEDICINE | Facility: CLINIC | Age: 78
End: 2023-01-27
Payer: COMMERCIAL

## 2023-01-27 VITALS
WEIGHT: 250 LBS | OXYGEN SATURATION: 98 % | SYSTOLIC BLOOD PRESSURE: 136 MMHG | DIASTOLIC BLOOD PRESSURE: 84 MMHG | RESPIRATION RATE: 18 BRPM | TEMPERATURE: 97.9 F | HEIGHT: 68 IN | HEART RATE: 86 BPM | BODY MASS INDEX: 37.89 KG/M2

## 2023-01-27 DIAGNOSIS — N18.32 STAGE 3B CHRONIC KIDNEY DISEASE (H): ICD-10-CM

## 2023-01-27 DIAGNOSIS — I10 BENIGN ESSENTIAL HYPERTENSION: ICD-10-CM

## 2023-01-27 DIAGNOSIS — E78.5 HYPERLIPIDEMIA LDL GOAL <130: ICD-10-CM

## 2023-01-27 DIAGNOSIS — E66.01 MORBID OBESITY (H): ICD-10-CM

## 2023-01-27 DIAGNOSIS — Z00.00 ENCOUNTER FOR MEDICARE ANNUAL WELLNESS EXAM: Primary | ICD-10-CM

## 2023-01-27 LAB
ANION GAP SERPL CALCULATED.3IONS-SCNC: 11 MMOL/L (ref 7–15)
BUN SERPL-MCNC: 18.2 MG/DL (ref 8–23)
CALCIUM SERPL-MCNC: 10.3 MG/DL (ref 8.8–10.2)
CHLORIDE SERPL-SCNC: 102 MMOL/L (ref 98–107)
CHOLEST SERPL-MCNC: 210 MG/DL
CREAT SERPL-MCNC: 1.44 MG/DL (ref 0.67–1.17)
CREAT UR-MCNC: 79.5 MG/DL
DEPRECATED HCO3 PLAS-SCNC: 23 MMOL/L (ref 22–29)
ERYTHROCYTE [DISTWIDTH] IN BLOOD BY AUTOMATED COUNT: 17.5 % (ref 10–15)
GFR SERPL CREATININE-BSD FRML MDRD: 51 ML/MIN/1.73M2
GLUCOSE SERPL-MCNC: 93 MG/DL (ref 70–99)
HCT VFR BLD AUTO: 35 % (ref 40–53)
HDLC SERPL-MCNC: 44 MG/DL
HGB BLD-MCNC: 11.7 G/DL (ref 13.3–17.7)
LDLC SERPL CALC-MCNC: 140 MG/DL
MCH RBC QN AUTO: 25.7 PG (ref 26.5–33)
MCHC RBC AUTO-ENTMCNC: 33.4 G/DL (ref 31.5–36.5)
MCV RBC AUTO: 77 FL (ref 78–100)
MICROALBUMIN UR-MCNC: 33.2 MG/L
MICROALBUMIN/CREAT UR: 41.76 MG/G CR (ref 0–17)
NONHDLC SERPL-MCNC: 166 MG/DL
PLATELET # BLD AUTO: 283 10E3/UL (ref 150–450)
POTASSIUM SERPL-SCNC: 4.3 MMOL/L (ref 3.4–5.3)
RBC # BLD AUTO: 4.56 10E6/UL (ref 4.4–5.9)
SODIUM SERPL-SCNC: 136 MMOL/L (ref 136–145)
TRIGL SERPL-MCNC: 130 MG/DL
WBC # BLD AUTO: 4 10E3/UL (ref 4–11)

## 2023-01-27 PROCEDURE — 99214 OFFICE O/P EST MOD 30 MIN: CPT | Mod: 25 | Performed by: NURSE PRACTITIONER

## 2023-01-27 PROCEDURE — 80048 BASIC METABOLIC PNL TOTAL CA: CPT | Performed by: NURSE PRACTITIONER

## 2023-01-27 PROCEDURE — 82043 UR ALBUMIN QUANTITATIVE: CPT | Performed by: NURSE PRACTITIONER

## 2023-01-27 PROCEDURE — 36415 COLL VENOUS BLD VENIPUNCTURE: CPT | Performed by: NURSE PRACTITIONER

## 2023-01-27 PROCEDURE — 80061 LIPID PANEL: CPT | Performed by: NURSE PRACTITIONER

## 2023-01-27 PROCEDURE — 82570 ASSAY OF URINE CREATININE: CPT | Performed by: NURSE PRACTITIONER

## 2023-01-27 PROCEDURE — 85027 COMPLETE CBC AUTOMATED: CPT | Performed by: NURSE PRACTITIONER

## 2023-01-27 PROCEDURE — G0438 PPPS, INITIAL VISIT: HCPCS | Performed by: NURSE PRACTITIONER

## 2023-01-27 RX ORDER — CARVEDILOL 25 MG/1
25 TABLET ORAL 2 TIMES DAILY WITH MEALS
Qty: 180 TABLET | Refills: 3 | Status: SHIPPED | OUTPATIENT
Start: 2023-01-27 | End: 2024-03-19

## 2023-01-27 RX ORDER — LISINOPRIL 40 MG/1
40 TABLET ORAL DAILY
Qty: 90 TABLET | Refills: 3 | Status: SHIPPED | OUTPATIENT
Start: 2023-01-27 | End: 2023-12-18

## 2023-01-27 RX ORDER — AMLODIPINE BESYLATE 10 MG/1
10 TABLET ORAL DAILY
Qty: 90 TABLET | Refills: 3 | Status: SHIPPED | OUTPATIENT
Start: 2023-01-27 | End: 2024-02-21

## 2023-01-27 ASSESSMENT — ENCOUNTER SYMPTOMS
HEMATOCHEZIA: 0
DIARRHEA: 0
FREQUENCY: 0
COUGH: 0
FEVER: 0
ARTHRALGIAS: 0
NAUSEA: 0
DIZZINESS: 0
NERVOUS/ANXIOUS: 0
WEAKNESS: 0
HEMATURIA: 0
SORE THROAT: 0
MYALGIAS: 0
JOINT SWELLING: 0
PARESTHESIAS: 0
HEARTBURN: 0
SHORTNESS OF BREATH: 0
HEADACHES: 0
ABDOMINAL PAIN: 0
EYE PAIN: 0
CHILLS: 0
DYSURIA: 0
CONSTIPATION: 0
PALPITATIONS: 0

## 2023-01-27 ASSESSMENT — ACTIVITIES OF DAILY LIVING (ADL): CURRENT_FUNCTION: HOUSEWORK REQUIRES ASSISTANCE

## 2023-01-27 NOTE — PATIENT INSTRUCTIONS
Patient Education   Personalized Prevention Plan  You are due for the preventive services outlined below.  Your care team is available to assist you in scheduling these services.  If you have already completed any of these items, please share that information with your care team to update in your medical record.  Health Maintenance Due   Topic Date Due     Urine Test  Never done     Diptheria Tetanus Pertussis (DTAP/TDAP/TD) Vaccine (1 - Tdap) Never done     Zoster (Shingles) Vaccine (1 of 2) Never done     AORTIC ANEURYSM SCREENING (SYSTEM ASSIGNED)  Never done     Annual Wellness Visit  02/21/2021     Cholesterol Lab  02/21/2021     Pneumococcal Vaccine (2 - PPSV23 if available, else PCV20) 02/21/2021     COVID-19 Vaccine (3 - Booster for Pfizer series) 10/11/2021     Hemoglobin  11/02/2021     ANNUAL REVIEW OF HM ORDERS  08/25/2022     Flu Vaccine (1) 09/01/2022     Basic Metabolic Panel  09/29/2022     Kidney Microalbumin Urine Test  09/29/2022     Your Health Risk Assessment indicates you feel you are not in good health    A healthy lifestyle helps keep the body fit and the mind alert. It helps protect you from disease, helps you fight disease, and helps prevent chronic disease (disease that doesn't go away) from getting worse. This is important as you get older and begin to notice twinges in muscles and joints and a decline in the strength and stamina you once took for granted. A healthy lifestyle includes good healthcare, good nutrition, weight control, recreation, and regular exercise. Avoid harmful substances and do what you can to keep safe. Another part of a healthy lifestyle is stay mentally active and socially involved.    Good healthcare     Have a wellness visit every year.     If you have new symptoms, let us know right away. Don't wait until the next checkup.     Take medicines exactly as prescribed and keep your medicines in a safe place. Tell us if your medicine causes problems.   Healthy diet  and weight control     Eat 3 or 4 small, nutritious, low-fat, high-fiber meals a day. Include a variety of fruits, vegetables, and whole-grain foods.     Make sure you get enough calcium in your diet. Calcium, vitamin D, and exercise help prevent osteoporosis (bone thinning).     If you live alone, try eating with others when you can. That way you get a good meal and have company while you eat it.     Try to keep a healthy weight. If you eat more calories than your body uses for energy, it will be stored as fat and you will gain weight.     Recreation   Recreation is not limited to sports and team events. It includes any activity that provides relaxation, interest, enjoyment, and exercise. Recreation provides an outlet for physical, mental, and social energy. It can give a sense of worth and achievement. It can help you stay healthy.    Mental Exercise and Social Involvement  Mental and emotional health is as important as physical health. Keep in touch with friends and family. Stay as active as possible. Continue to learn and challenge yourself.   Things you can do to stay mentally active are:    Learn something new, like a foreign language or musical instrument.     Play SCRABBLE or do crossword puzzles. If you cannot find people to play these games with you at home, you can play them with others on your computer through the Internet.     Join a games club--anything from card games to chess or checkers or lawn bowling.     Start a new hobby.     Go back to school.     Volunteer.     Read.   Keep up with world events.    Understanding USDA MyPlate  The USDA has guidelines to help you make healthy food choices. These are called MyPlate. MyPlate shows the food groups that make up healthy meals using the image of a place setting. Before you eat, think about the healthiest choices for what to put on your plate or in your cup or bowl. To learn more about building a healthy plate, visit www.choosemyplate.gov.    The food  groups    Fruits. Any fruit or 100% fruit juice counts as part of the Fruit Group. Fruits may be fresh, canned, frozen, or dried, and may be whole, cut-up, or pureed. Make 1/2 of your plate fruits and vegetables.    Vegetables. Any vegetable or 100% vegetable juice counts as a member of the Vegetable Group. Vegetables may be fresh, frozen, canned, or dried. They can be served raw or cooked and may be whole, cut-up, or mashed. Make 1/2 of your plate fruits and vegetables.    Grains. All foods made from grains are part of the Grains Group. These include wheat, rice, oats, cornmeal, and barley. Grains are often used to make foods such as bread, pasta, oatmeal, cereal, tortillas, and grits. Grains should be no more than 1/4 of your plate. At least half of your grains should be whole grains.    Protein. This group includes meat, poultry, seafood, beans and peas, eggs, processed soy products (such as tofu), nuts (including nut butters), and seeds. Make protein choices no more than 1/4 of your plate. Meat and poultry choices should be lean or low fat.    Dairy. The Dairy Group includes all fluid milk products and foods made from milk that contain calcium, such as yogurt and cheese. (Foods that have little calcium, such as cream, butter, and cream cheese, are not part of this group.) Most dairy choices should be low-fat or fat-free.    Oils. Oils aren't a food group, but they do contain essential nutrients. However it's important to watch your intake of oils. These are fats that are liquid at room temperature. They include canola, corn, olive, soybean, vegetable, and sunflower oil. Foods that are mainly oil include mayonnaise, certain salad dressings, and soft margarines. You likely already get your daily oil allowance from the foods you eat.  Things to limit  Eating healthy also means limiting these things in your diet:       Salt (sodium). Many processed foods have a lot of sodium. To keep sodium intake down, eat fresh  vegetables, meats, poultry, and seafood when possible. Purchase low-sodium, reduced-sodium, or no-salt-added food products at the store. And don't add salt to your meals at home. Instead, season them with herbs and spices such as dill, oregano, cumin, and paprika. Or try adding flavor with lemon or lime zest and juice.    Saturated fat. Saturated fats are most often found in animal products such as beef, pork, and chicken. They are often solid at room temperature, such as butter. To reduce your saturated fat intake, choose leaner cuts of meat and poultry. And try healthier cooking methods such as grilling, broiling, roasting, or baking. For a simple lower-fat swap, use plain nonfat yogurt instead of mayonnaise when making potato salad or macaroni salad.    Added sugars. These are sugars added to foods. They are in foods such as ice cream, candy, soda, fruit drinks, sports drinks, energy drinks, cookies, pastries, jams, and syrups. Cut down on added sugars by sharing sweet treats with a family member or friend. You can also choose fruit for dessert, and drink water or other unsweetened beverages.     [employer] last reviewed this educational content on 6/1/2020 2000-2021 [employer]. All rights reserved. This information is not intended as a substitute for professional medical care. Always follow your healthcare professional's instructions.        Activities of Daily Living    Your Health Risk Assessment indicates you have difficulties with activities of daily living such as housework, bathing, preparing meals, taking medication, etc. Please make a follow up appointment for us to address this issue in more detail.

## 2023-01-27 NOTE — PROGRESS NOTES
"SUBJECTIVE:   Bharat is a 77 year old who presents for Preventive Visit.  Patient has been advised of split billing requirements and indicates understanding: Yes  Are you in the first 12 months of your Medicare coverage?  No    Healthy Habits:     In general, how would you rate your overall health?  Fair    Frequency of exercise:  2-3 days/week    Duration of exercise:  Less than 15 minutes    Do you usually eat at least 4 servings of fruit and vegetables a day, include whole grains    & fiber and avoid regularly eating high fat or \"junk\" foods?  No    Taking medications regularly:  Yes    Medication side effects:  None    Ability to successfully perform activities of daily living:  Housework requires assistance    Home Safety:  No safety concerns identified    Hearing Impairment:  No hearing concerns    In the past 6 months, have you been bothered by leaking of urine?  No    In general, how would you rate your overall mental or emotional health?  Good      PHQ-2 Total Score: 1    Additional concerns today:  No    Recent Labs   Lab Test 02/21/20  1108 10/24/18  1041   CHOL 180 139   HDL 39* 46   * 72   TRIG 163* 105       Previously followed by nephrologist.[facility] in [place], [place]. - Dr. Daniels     Previously lived in [place] and now lives in [place].    Lives with wife.  Is originally from [place].  Has been here in [place] for almost 5 years.    5 living children.  4 children live here in [place].  1 other son lives in [place].       Son (Estella) lives in [place] and helps with appointments.    Other son Cassie is here today with patient.      Have you ever done Advance Care Planning? (For example, a Health Directive, POLST, or a discussion with a medical provider or your loved ones about your wishes): No, advance care planning information given to patient to review.  Patient declined advance care planning discussion at this time.      Fall risk  Fallen 2 or more times in the past year?: " No  Any fall with injury in the past year?: No    Cognitive Screening   1) Repeat 3 items (Leader, Season, Table)    2) Clock draw: NORMAL  3) 3 item recall: Recalls 3 objects  Results: 3 items recalled: COGNITIVE IMPAIRMENT LESS LIKELY    Mini-CogTM Copyright S Ines. Licensed by the author for use in [facility]; reprinted with permission (isabel@[email or web address]). All rights reserved.        Reviewed and updated as needed this visit by clinical staff   Tobacco  Allergies               Reviewed and updated as needed this visit by Provider                 Social History     Tobacco Use     Smoking status: Never     Smokeless tobacco: Never   Substance Use Topics     Alcohol use: No     If you drink alcohol do you typically have >3 drinks per day or >7 drinks per week? No    Alcohol Use 1/27/2023   Prescreen: >3 drinks/day or >7 drinks/week? Not Applicable   Prescreen: >3 drinks/day or >7 drinks/week? -       Hypertension Follow-up  120-130's/80's at home.      Do you check your blood pressure regularly outside of the clinic? Yes     Are you following a low salt diet? No    Are your blood pressures ever more than 140 on the top number (systolic) OR more   than 90 on the bottom number (diastolic), for example 140/90? Yes      Current providers sharing in care for this patient include:   Patient Care Team:  Linda Gonzalez APRN CNP as PCP - General (Nurse Practitioner - Family)  Ema Carrillo RN as Lead Care Coordinator (Primary Care - CC)  Rl Figuerao MD as Assigned PCP    The following health maintenance items are reviewed in Epic and correct as of today:  Health Maintenance   Topic Date Due     URINALYSIS  Never done     DTAP/TDAP/TD IMMUNIZATION (1 - Tdap) Never done     ZOSTER IMMUNIZATION (1 of 2) Never done     AORTIC ANEURYSM SCREENING (SYSTEM ASSIGNED)  Never done     MEDICARE ANNUAL WELLNESS VISIT  02/21/2021     LIPID  02/21/2021     Pneumococcal Vaccine: 65+ Years (2 - PPSV23 if available,  else PCV20) 02/21/2021     COVID-19 Vaccine (3 - Booster for Pfizer series) 10/11/2021     HEMOGLOBIN  11/02/2021     INFLUENZA VACCINE (1) 09/01/2022     BMP  09/29/2022     MICROALBUMIN  09/29/2022     ANNUAL REVIEW OF HM ORDERS  01/27/2024     FALL RISK ASSESSMENT  01/27/2024     ADVANCE CARE PLANNING  01/27/2028     COLORECTAL CANCER SCREENING  11/14/2028     HEPATITIS C SCREENING  Completed     PHQ-2 (once per calendar year)  Completed     IPV IMMUNIZATION  Aged Out     MENINGITIS IMMUNIZATION  Aged Out     BP Readings from Last 3 Encounters:   01/27/23 136/84   11/29/21 135/82   09/29/21 (!) 149/90    Wt Readings from Last 3 Encounters:   01/27/23 113.4 kg (250 lb)   09/29/21 108.9 kg (240 lb)   08/25/21 111 kg (244 lb 11.2 oz)                  Patient Active Problem List   Diagnosis     Benign essential hypertension     CKD (chronic kidney disease) stage 3, GFR 30-59 ml/min (H)     Hyperlipidemia LDL goal <130     Obesity (BMI 35.0-39.9) with comorbidity (H)     Positive for macroalbuminuria     Past Surgical History:   Procedure Laterality Date     COLONOSCOPY N/A 11/14/2018    Procedure: COMBINED COLONOSCOPY, SINGLE OR MULTIPLE BIOPSY/POLYPECTOMY BY BIOPSY;  Surgeon: Azalea Byrne MD;  Location:  GI       Social History     Tobacco Use     Smoking status: Never     Smokeless tobacco: Never   Substance Use Topics     Alcohol use: No     Family History   Problem Relation Age of Onset     Unknown/Adopted Mother      Unknown/Adopted Father      Diabetes No family hx of      Coronary Artery Disease No family hx of      Hypertension No family hx of      Hyperlipidemia No family hx of      Cerebrovascular Disease No family hx of      Breast Cancer No family hx of      Colon Cancer No family hx of      Prostate Cancer No family hx of      Other Cancer No family hx of      Depression No family hx of      Anxiety Disorder No family hx of      Mental Illness No family hx of      Substance Abuse No family hx  "of      Anesthesia Reaction No family hx of      Asthma No family hx of      Osteoporosis No family hx of      Genetic Disorder No family hx of      Thyroid Disease No family hx of      Obesity No family hx of          Current Outpatient Medications   Medication Sig Dispense Refill     amLODIPine (NORVASC) 10 MG tablet Take 1 tablet (10 mg) by mouth daily 90 tablet 3     aspirin 81 MG tablet Take 81 mg by mouth daily       carvedilol (COREG) 25 MG tablet Take 1 tablet (25 mg) by mouth 2 times daily (with meals) 180 tablet 3     lisinopril (ZESTRIL) 40 MG tablet Take 1 tablet (40 mg) by mouth daily 90 tablet 3     vitamin D3 (CHOLECALCIFEROL) 50 mcg (2000 units) tablet Take 1 tablet (50 mcg) by mouth daily 180 tablet 1             Review of Systems   Constitutional: Negative for chills and fever.   HENT: Negative for congestion, ear pain, hearing loss and sore throat.    Eyes: Negative for pain and visual disturbance.   Respiratory: Negative for cough and shortness of breath.    Cardiovascular: Negative for chest pain, palpitations and peripheral edema.   Gastrointestinal: Negative for abdominal pain, constipation, diarrhea, heartburn, hematochezia and nausea.   Genitourinary: Negative for dysuria, frequency, genital sores, hematuria, impotence, penile discharge and urgency.   Musculoskeletal: Negative for arthralgias, joint swelling and myalgias.   Skin: Negative for rash.   Neurological: Negative for dizziness, weakness, headaches and paresthesias.   Psychiatric/Behavioral: Negative for mood changes. The patient is not nervous/anxious.        OBJECTIVE:   /84   Pulse 86   Temp 97.9  F (36.6  C)   Resp 18   Ht 1.727 m (5' 8\")   Wt 113.4 kg (250 lb)   SpO2 98%   BMI 38.01 kg/m   Estimated body mass index is 38.01 kg/m  as calculated from the following:    Height as of this encounter: 1.727 m (5' 8\").    Weight as of this encounter: 113.4 kg (250 lb).     Physical Exam     GENERAL: healthy, alert and no " distress  EYES: Eyes grossly normal to inspection  HENT: ear canals and TM's normal, nose and mouth without ulcers or lesions  NECK: no adenopathy, no asymmetry, masses, or scars and thyroid normal to palpation  RESP: lungs clear to auscultation - no rales, rhonchi or wheezes  CV: regular rate and rhythm, normal S1 S2, no S3 or S4, no murmur, click or rub, no peripheral edema  ABDOMEN: soft, nontender, no hepatosplenomegaly, no masses and bowel sounds normal  MS: no gross musculoskeletal defects noted, no edema  SKIN: no suspicious lesions or rashes  NEURO: Normal strength and tone, mentation intact and speech normal  PSYCH: mentation appears normal, affect normal/bright    ASSESSMENT / PLAN:     Bharat was seen today for physical.    Diagnoses and all orders for this visit:    Encounter for Medicare annual wellness exam    Benign essential hypertension  Currently stable on Amlodipine 10 mg daily, Lisinopril 40 mg and Carvedilol 25 mg twice daily.    -     amLODIPine (NORVASC) 10 MG tablet; Take 1 tablet (10 mg) by mouth daily  -     lisinopril (ZESTRIL) 40 MG tablet; Take 1 tablet (40 mg) by mouth daily  -     carvedilol (COREG) 25 MG tablet; Take 1 tablet (25 mg) by mouth 2 times daily (with meals)    Hyperlipidemia LDL goal <130  Recent Labs   Lab Test 02/21/20  1108 10/24/18  1041   CHOL 180 139   HDL 39* 46   * 72   TRIG 163* 105   Due for recheck of lipid panel.  No previous statin therapy, lipids pending.    -     Lipid panel reflex to direct LDL Non-fasting    Stage 3b chronic kidney disease (H)  Previously followed by nephrologist.[facility] in [place], [place]. - Dr. Daniels  -     BASIC METABOLIC PANEL  -     Albumin Random Urine Quantitative with Creat Ratio  -     CBC with platelets    Morbid obesity (H)  Continue to work with patient regarding dietary and lifestyle modifications to support weight loss.      Other orders  -     REVIEW OF HEALTH MAINTENANCE PROTOCOL  ORDERS            COUNSELING:  Reviewed preventive health counseling, as reflected in patient instructions        He reports that he has never smoked. He has never used smokeless tobacco.      Appropriate preventive services were discussed with this patient, including applicable screening as appropriate for cardiovascular disease, diabetes, osteopenia/osteoporosis, and glaucoma.  As appropriate for age/gender, discussed screening for colorectal cancer, prostate cancer, breast cancer, and cervical cancer. Checklist reviewing preventive services available has been given to the patient.    Reviewed patients plan of care and provided an AVS. The Basic Care Plan (routine screening as documented in Health Maintenance) for Bharat meets the Care Plan requirement. This Care Plan has been established and reviewed with the Patient and son.      Linda Gonzalez, MIHAELA [facility] [place]    Identified Health Risks:

## 2023-01-27 NOTE — PROGRESS NOTES
The patient was provided with suggestions to help him develop a healthy physical lifestyle.  The patient was counseled and encouraged to consider modifying their diet and eating habits. He was provided with information on recommended healthy diet options.

## 2023-02-01 DIAGNOSIS — E83.52 SERUM CALCIUM ELEVATED: Primary | ICD-10-CM

## 2023-02-01 NOTE — RESULT ENCOUNTER NOTE
Dear Bharat,     -Red blood cell (hgb) levels are stable, normal white blood cell count and normal platelet levels.  -LDL(bad) cholesterol level is elevated which can increase your heart disease risk.  A diet high in fat and simple carbohydrates, genetics and being overweight can contribute to this. ADVISE: exercising 150 minutes of aerobic exercise per week (30 minutes for 5 days per week or 50 minutes for 3 days per week are options) and eating a low saturated fat/low carbohydrate diet are helpful to improve this. In 6-12 months, you should recheck your fasting cholesterol panel.  We can consider starting a cholesterol medication to better control your cholesterol levels.    -Kidney function (GFR) is stable and improved from this was last checked.    -Sodium is normal.  -Potassium is normal.  -Calcium is elevated.  ADVISE: rechecking this in 1 month.  -Glucose (diabetic screening test) is normal.  -Microalbumin (urine protein) level is elevated. This is suggestive of early damage to your kidneys from high blood pressure.  ADVISE: avoiding anti-inflamatory agents such as ibuprofen (Advil, Motrin) or naproxen (Aleve) as much as possible, keeping your blood pressure in a normal range, and continuing your medication (lisinopril) that helps protect your kidneys.  Also, this should be rechecked in 1 year.       Please send a [employer] message or call [phone]  if you have any questions.      Linda Gonzalez, MIHAELA, CNP  [facility]    If you have further questions about the interpretation of your labs, [email or web address] is a good website to check out for further information.

## 2023-04-07 ENCOUNTER — TELEPHONE (OUTPATIENT)
Dept: FAMILY MEDICINE | Facility: CLINIC | Age: 78
End: 2023-04-07
Payer: COMMERCIAL

## 2023-04-07 NOTE — TELEPHONE ENCOUNTER
Patient's son is calling to report that patient's legs are swollen and patient is starting to limp when he walks. Patient is schedule to see Linda Gonzalez on 4/13. Patient's son is wondering if there is a sooner appointment earlier in the week OR if there is an appointment available earlier in the day of 4/13 since son has to work. Please call Estella since he will be taking patient to appointment, at [phone] to let him know. c2c on file.

## 2023-04-10 NOTE — TELEPHONE ENCOUNTER
Placed call to patient's son to offer sooner appointment. SD spot already taken. Appointment on 4/13 will be kept.

## 2023-04-13 ENCOUNTER — OFFICE VISIT (OUTPATIENT)
Dept: FAMILY MEDICINE | Facility: CLINIC | Age: 78
End: 2023-04-13
Payer: COMMERCIAL

## 2023-04-13 VITALS
SYSTOLIC BLOOD PRESSURE: 126 MMHG | DIASTOLIC BLOOD PRESSURE: 90 MMHG | RESPIRATION RATE: 14 BRPM | WEIGHT: 248 LBS | TEMPERATURE: 97.2 F | BODY MASS INDEX: 37.59 KG/M2 | HEIGHT: 68 IN | OXYGEN SATURATION: 97 % | HEART RATE: 83 BPM

## 2023-04-13 DIAGNOSIS — M79.89 LEG SWELLING: Primary | ICD-10-CM

## 2023-04-13 PROCEDURE — 99213 OFFICE O/P EST LOW 20 MIN: CPT | Performed by: NURSE PRACTITIONER

## 2023-04-13 NOTE — PROGRESS NOTES
"  Assessment & Plan     Bharat was seen today for edema.    Diagnoses and all orders for this visit: Given lack of warmth, systemic signs, and improving swelling- concern for cellulitis is low. DVT remains possible. Proceed with ultrasound.      Leg swelling - left ankle and left foot  -     US Lower Extremity Venous Duplex Left; Future       BMI:   Estimated body mass index is 37.71 kg/m  as calculated from the following:    Height as of this encounter: 1.727 m (5' 8\").    Weight as of this encounter: 112.5 kg (248 lb).       Tim Ruffin RN, FNP Student     I was present with the student who participated in the service and in the documentation of the note, which I have reviewed and verified. The history, reviews of systems, objective data, and assessment/plan were completed by myself.      Linda Gonzalez, [facility] [place]          [facility]   Bharat is a 78 year old, presenting for the following health issues:  Edema    Pt presents with son for evaluation of 1 week of L ankle/foot swelling. This began shortly after pt struck his foot in bathroom. Denies skin break, fever, or redness. States that the swelling has improved from its worst state during which pt required cane to ambulate. Swelling improves with elevation/compression.             4/13/2023     2:21 PM   Additional Questions   Roomed by Elvira GALAVIZ     History of Present Illness       Reason for visit:  Swollen leg  Symptom onset:  1-2 weeks ago  Symptom intensity:  Severe  Symptom progression:  Improving  Had these symptoms before:  Yes  Has tried/received treatment for these symptoms:  No  What makes it worse:  No  What makes it better:  Not really    He eats 2-3 servings of fruits and vegetables daily.He consumes 0 sweetened beverage(s) daily.He exercises with enough effort to increase his heart rate 10 to 19 minutes per day.  He exercises with enough effort to increase his heart rate 3 or less days per week.   He is taking " "medications regularly.               Review of Systems   Constitutional, HEENT, cardiovascular, pulmonary, gi and gu systems are negative, except as otherwise noted.      Objective    BP (!) 126/90   Pulse 83   Temp 97.2  F (36.2  C) (Tympanic)   Resp 14   Ht 1.727 m (5' 8\")   Wt 112.5 kg (248 lb)   SpO2 97%   BMI 37.71 kg/m    Body mass index is 37.71 kg/m .  Physical Exam   GENERAL: healthy, alert and no distress  RESP: lungs clear to auscultation - no rales, rhonchi or wheezes  CV: regular rate and rhythm, normal S1 S2, no S3 or S4, no murmur, click or rub,peripheral pulses strong. +1 pitting edema to RLE. +2 to LLE.   ABDOMEN: soft, nontender, no hepatosplenomegaly, no masses and bowel sounds normal  MS: no gross musculoskeletal defects noted, no edema. Firm cordlike mass to L ankle. (pt states this has been present since childhood)  Int: hyperpigmentation to dorsolateral aspect of L foot.                     "

## 2023-04-19 ENCOUNTER — HOSPITAL ENCOUNTER (OUTPATIENT)
Dept: ULTRASOUND IMAGING | Facility: CLINIC | Age: 78
Discharge: HOME | End: 2023-04-19
Attending: NURSE PRACTITIONER | Admitting: NURSE PRACTITIONER
Payer: COMMERCIAL

## 2023-04-19 DIAGNOSIS — M79.89 LEG SWELLING: ICD-10-CM

## 2023-04-19 PROCEDURE — 93971 EXTREMITY STUDY: CPT | Mod: LT

## 2023-04-21 NOTE — RESULT ENCOUNTER NOTE
Dear Bharat,     Your recent ultrasound was overall reassuring with no evidence of a clot.   There was a notation of a likely Baker's cyst behind the knee which doesn't always cause pain or concerns.        Please send a [employer] message or call [phone]  if you have any questions.      Linda Gonzalez, MIHAELA, CNP  [facility]    If you have further questions about the interpretation of your labs, [email or web address] is a good website to check out for further information.

## 2023-07-18 ENCOUNTER — PATIENT OUTREACH (OUTPATIENT)
Dept: GERIATRIC MEDICINE | Facility: CLINIC | Age: 78
End: 2023-07-18
Payer: COMMERCIAL

## 2023-07-18 NOTE — PROGRESS NOTES
[facility] Care Coordination Contact      [facility] Six-Month Telephone Assessment    6 month telephone assessment completed on 07182023.    ER visits: No  Hospitalizations: No  TCU stays: No  Significant health status changes: N/A  Falls/Injuries: No  ADL/IADL changes: No  Changes in services: No    Caregiver Assessment follow up:  N/A    Goals: See POC in chart for goal progress documentation.      Will see member in 6 months for an annual health risk assessment.   Encouraged member to call CC with any questions or concerns in the meantime.     Ema Carrillo RN BSN PHN      [facility]  Care Coordinator  Phone:   [phone]  Fax:       [phone]

## 2023-11-21 ENCOUNTER — PATIENT OUTREACH (OUTPATIENT)
Dept: GERIATRIC MEDICINE | Facility: CLINIC | Age: 78
End: 2023-11-21
Payer: COMMERCIAL

## 2023-11-21 NOTE — LETTER
November 22, 2023    LORETO PYLE  [address]  [facility] [address]        Dear Loreto:    As a member of [facility]'s MSC+ program, we offer a health risk assessment at no cost to you. I know you don't want to have the assessment right now. If you change your mind, please call me at the number below.    Who performs the health risk assessment?  A [facility] Care Coordinator performs the assessment. Our Care Coordinators can also help you understand your benefits. They can tell you about services to aid you at home, such as managing your care with your doctors if your health worsens.    Our Care Coordinators are here for you if you need:  Support for activities you used to do by yourself (including making meals, bathing and paying bills)  Equipment for bathroom or home safety  Help finding a new place to live  Information on staying healthy, preventing falls and immunizations    Questions?  If you have questions, or you would like to do he assessment, call me at [phone]. TTY users call [phone]. I'm here from 8am to 5pm. I may reach out to you again soon.       Sincerely,         RICHA Christopher, RN, PHN    E-mail: [email or web address]  Phone: [phone]      [employer]      \<[id number] accepted  [id number]>    [id number] (21/2021)

## 2023-11-21 NOTE — PROGRESS NOTES
[facility] Care Coordination Contact      [facility] Refusal Telephone Assessment    PC to member and also spoke to member's son Estella.  Member refused home visit HRA on 11/21/2023 (reason: not interested at this time).    ER visits: No  Hospitalizations: No  Health concerns: n/a  Falls/Injuries: No  ADL/IADL Dependencies:N/A        Member currently receiving the following home care services:   N/A  Member currently receiving the following community resources:  N/A  Informal support(s):  Children, son    Advanced Care Planning discussion, complete code section.    [facility] sponsored benefits: Shared information re: Silver Sneakers/gym memberships, ASA, Calcium +D.    Follow-Up Plan: Member informed of future contact, plan to f/u with member with a 6 month telephone assessment and offer a home visit.  Contact information shared with member and family, encouraged member to call with any questions or concerns at any time.    This CC note routed to PCP, Linda Gonzalez RN BSN PHN      [facility]  Care Coordinator  Phone:   [phone]  Fax:       [phone]

## 2023-11-22 NOTE — PROGRESS NOTES
"[facility] Care Coordination Contact    Per CC, mailed client a \"Refusal of Home Visit\" letter.    Nori Vazquez  Care Management Specialist  [facility]  [phone]     "

## 2023-12-17 DIAGNOSIS — I10 BENIGN ESSENTIAL HYPERTENSION: ICD-10-CM

## 2023-12-18 RX ORDER — LISINOPRIL 40 MG/1
40 TABLET ORAL DAILY
Qty: 90 TABLET | Refills: 0 | Status: SHIPPED | OUTPATIENT
Start: 2023-12-18 | End: 2024-03-19

## 2023-12-18 NOTE — TELEPHONE ENCOUNTER
Short term refill.   Please send reminder about Medicare Wellness + Med Check due late January.      - Diana, CNP

## 2024-02-21 DIAGNOSIS — I10 BENIGN ESSENTIAL HYPERTENSION: ICD-10-CM

## 2024-02-21 RX ORDER — AMLODIPINE BESYLATE 10 MG/1
10 TABLET ORAL DAILY
Qty: 90 TABLET | Refills: 0 | Status: SHIPPED | OUTPATIENT
Start: 2024-02-21 | End: 2024-03-19

## 2024-03-19 ENCOUNTER — OFFICE VISIT (OUTPATIENT)
Dept: FAMILY MEDICINE | Facility: CLINIC | Age: 79
End: 2024-03-19
Payer: COMMERCIAL

## 2024-03-19 VITALS
RESPIRATION RATE: 18 BRPM | HEIGHT: 68 IN | DIASTOLIC BLOOD PRESSURE: 84 MMHG | TEMPERATURE: 97.2 F | WEIGHT: 248 LBS | SYSTOLIC BLOOD PRESSURE: 144 MMHG | BODY MASS INDEX: 37.59 KG/M2 | OXYGEN SATURATION: 97 % | HEART RATE: 87 BPM

## 2024-03-19 DIAGNOSIS — Z00.00 ROUTINE GENERAL MEDICAL EXAMINATION AT A HEALTH CARE FACILITY: Primary | ICD-10-CM

## 2024-03-19 DIAGNOSIS — E78.5 HYPERLIPIDEMIA LDL GOAL <130: ICD-10-CM

## 2024-03-19 DIAGNOSIS — E66.01 MORBID OBESITY (H): ICD-10-CM

## 2024-03-19 DIAGNOSIS — N18.31 STAGE 3A CHRONIC KIDNEY DISEASE (H): ICD-10-CM

## 2024-03-19 DIAGNOSIS — I10 BENIGN ESSENTIAL HYPERTENSION: ICD-10-CM

## 2024-03-19 LAB
ALBUMIN UR-MCNC: NEGATIVE MG/DL
APPEARANCE UR: CLEAR
BACTERIA #/AREA URNS HPF: ABNORMAL /HPF
BILIRUB UR QL STRIP: NEGATIVE
COLOR UR AUTO: YELLOW
ERYTHROCYTE [DISTWIDTH] IN BLOOD BY AUTOMATED COUNT: 16.3 % (ref 10–15)
GLUCOSE UR STRIP-MCNC: NEGATIVE MG/DL
HCT VFR BLD AUTO: 36.4 % (ref 40–53)
HGB BLD-MCNC: 12.2 G/DL (ref 13.3–17.7)
HGB UR QL STRIP: ABNORMAL
KETONES UR STRIP-MCNC: NEGATIVE MG/DL
LEUKOCYTE ESTERASE UR QL STRIP: NEGATIVE
MCH RBC QN AUTO: 26 PG (ref 26.5–33)
MCHC RBC AUTO-ENTMCNC: 33.5 G/DL (ref 31.5–36.5)
MCV RBC AUTO: 77 FL (ref 78–100)
MUCOUS THREADS #/AREA URNS LPF: PRESENT /LPF
NITRATE UR QL: NEGATIVE
PH UR STRIP: 5.5 [PH] (ref 5–7)
PLATELET # BLD AUTO: 277 10E3/UL (ref 150–450)
RBC # BLD AUTO: 4.7 10E6/UL (ref 4.4–5.9)
RBC #/AREA URNS AUTO: ABNORMAL /HPF
SP GR UR STRIP: 1.01 (ref 1–1.03)
UROBILINOGEN UR STRIP-ACNC: 0.2 E.U./DL
WBC # BLD AUTO: 3.6 10E3/UL (ref 4–11)
WBC #/AREA URNS AUTO: ABNORMAL /HPF

## 2024-03-19 PROCEDURE — 90677 PCV20 VACCINE IM: CPT | Performed by: NURSE PRACTITIONER

## 2024-03-19 PROCEDURE — 82043 UR ALBUMIN QUANTITATIVE: CPT | Performed by: NURSE PRACTITIONER

## 2024-03-19 PROCEDURE — 90471 IMMUNIZATION ADMIN: CPT | Performed by: NURSE PRACTITIONER

## 2024-03-19 PROCEDURE — 99397 PER PM REEVAL EST PAT 65+ YR: CPT | Mod: 25 | Performed by: NURSE PRACTITIONER

## 2024-03-19 PROCEDURE — 80048 BASIC METABOLIC PNL TOTAL CA: CPT | Performed by: NURSE PRACTITIONER

## 2024-03-19 PROCEDURE — 36415 COLL VENOUS BLD VENIPUNCTURE: CPT | Performed by: NURSE PRACTITIONER

## 2024-03-19 PROCEDURE — 99214 OFFICE O/P EST MOD 30 MIN: CPT | Mod: 25 | Performed by: NURSE PRACTITIONER

## 2024-03-19 PROCEDURE — 81001 URINALYSIS AUTO W/SCOPE: CPT | Performed by: NURSE PRACTITIONER

## 2024-03-19 PROCEDURE — 80061 LIPID PANEL: CPT | Performed by: NURSE PRACTITIONER

## 2024-03-19 PROCEDURE — 82570 ASSAY OF URINE CREATININE: CPT | Performed by: NURSE PRACTITIONER

## 2024-03-19 PROCEDURE — 85027 COMPLETE CBC AUTOMATED: CPT | Performed by: NURSE PRACTITIONER

## 2024-03-19 RX ORDER — AMLODIPINE BESYLATE 10 MG/1
10 TABLET ORAL DAILY
Qty: 90 TABLET | Refills: 3 | Status: SHIPPED | OUTPATIENT
Start: 2024-03-19

## 2024-03-19 RX ORDER — LISINOPRIL 40 MG/1
40 TABLET ORAL DAILY
Qty: 90 TABLET | Refills: 3 | Status: SHIPPED | OUTPATIENT
Start: 2024-03-19

## 2024-03-19 RX ORDER — CARVEDILOL 25 MG/1
25 TABLET ORAL 2 TIMES DAILY WITH MEALS
Qty: 180 TABLET | Refills: 3 | Status: SHIPPED | OUTPATIENT
Start: 2024-03-19

## 2024-03-19 SDOH — HEALTH STABILITY: PHYSICAL HEALTH: ON AVERAGE, HOW MANY DAYS PER WEEK DO YOU ENGAGE IN MODERATE TO STRENUOUS EXERCISE (LIKE A BRISK WALK)?: 6 DAYS

## 2024-03-19 ASSESSMENT — SOCIAL DETERMINANTS OF HEALTH (SDOH): HOW OFTEN DO YOU GET TOGETHER WITH FRIENDS OR RELATIVES?: MORE THAN THREE TIMES A WEEK

## 2024-03-19 NOTE — PROGRESS NOTES
"Preventive Care Visit  [facility]  MIHAELA Clark CNP, Family Medicine  Mar 19, 2024      Assessment & Plan     Bharat was seen today for physical.    Diagnoses and all orders for this visit:    Routine general medical examination at a health care facility  -     Pneumococcal 20 Valent Conjugate (PCV20)  -     REVIEW OF HEALTH MAINTENANCE PROTOCOL ORDERS  -     PRIMARY CARE FOLLOW-UP SCHEDULING; Future    Stage 3a chronic kidney disease (H)  Surveillance labs and urine studies today.    -     UA Macroscopic with reflex to Microscopic and Culture  -     BASIC METABOLIC PANEL  -     Albumin Random Urine Quantitative with Creat Ratio  -     CBC with platelets  -     UA Microscopic with Reflex to Culture    Hyperlipidemia LDL goal <130  Recent Labs   Lab Test 03/19/24  1546 01/27/23  1202   CHOL 163 210*   HDL 40 44   * 140*   TRIG 74 130     -     Lipid panel reflex to direct LDL Non-fasting    Benign essential hypertension  Noted elevation of blood pressure in clinic.  Discussed goal blood pressure of less than 140/90 and close monitoring of BP at home.  Follow-up in clinic with persistently elevated blood pressures at home.    -     amLODIPine (NORVASC) 10 MG tablet; Take 1 tablet (10 mg) by mouth daily  -     carvedilol (COREG) 25 MG tablet; Take 1 tablet (25 mg) by mouth 2 times daily (with meals)  -     lisinopril (ZESTRIL) 40 MG tablet; Take 1 tablet (40 mg) by mouth daily    Morbid obesity (H)  Co-morbid diagnosis:   HTN.   Continue to work with patient regarding dietary and lifestyle modifications to support weight loss.        BMI  Estimated body mass index is 37.71 kg/m  as calculated from the following:    Height as of this encounter: 1.727 m (5' 8\").    Weight as of this encounter: 112.5 kg (248 lb).       Counseling  Appropriate preventive services were discussed with this patient.  Checklist reviewing preventive services available has been given to the " "patient.      Return in about 1 year (around 3/19/2025) for Medicare Wellness Visit + Medication Check.      Dimitris Nicholson is a 79 year old, presenting for the following:  Physical      Health Care Directive  Patient does not have a Health Care Directive or Living Will.      Healthy Habits:     In general, how would you rate your overall health?  Good    Frequency of exercise:  6-7 days/week    Duration of exercise:  Other    Do you usually eat at least 4 servings of fruit and vegetables a day, include whole grains    & fiber and avoid regularly eating high fat or \"junk\" foods?  Yes    Taking medications regularly:  Yes    Barriers to taking medications:  None    Medication side effects:  None    Current function: son comes over to help with chores.    Home Safety:  No safety concerns identified    Hearing Impairment:  No hearing concerns    In the past 6 months, have you been bothered by leaking of urine?  No    In general, how would you rate your overall mental or emotional health?  Feliberto Nicholson reports that he is getting older and notices that his strength has decreased.   Will intermittently wake up and have difficulty with getting into shower and will need help from his son.   Son talked with care coordinator about getting PCA services.  Planning evaluation in April for PCA services.      Planning travel to [facility] in June and will be gone for approximately one month.      Hypertension Follow-up  140/70-80's at home.    Do you check your blood pressure regularly outside of the clinic? Yes   Are you following a low salt diet? No  Are your blood pressures ever more than 140 on the top number (systolic) OR more   than 90 on the bottom number (diastolic), for example 140/90?         3/19/2024   General Health   How would you rate your overall physical health? Good   Feel stress (tense, anxious, or unable to sleep) Rather much   (!) STRESS CONCERN      3/19/2024   Nutrition   Diet: Low salt    Low " fat/cholesterol         3/19/2024   Exercise   Days per week of moderate/strenous exercise 6 days         3/19/2024   Social Factors   Frequency of gathering with friends or relatives More than three times a week   Worry food won't last until get money to buy more No   Food not last or not have enough money for food? No   Do you have housing?  Yes   Are you worried about losing your housing? No   Lack of transportation? No   Unable to get utilities (heat,electricity)? No         3/19/2024   Activities of Daily Living- Home Safety   Needs help with the following daily activites Preparing meals    Housework    Bathing    Laundry   Safety concerns in the home None of the above         3/19/2024   Dental   Dentist two times every year? Yes         3/19/2024   Hearing Screening   Hearing concerns? None of the above         3/19/2024   Driving Risk Screening   Patient/family members have concerns about driving No         3/19/2024   General Alertness/Fatigue Screening   Have you been more tired than usual lately? (!) YES         3/19/2024   Urinary Incontinence Screening   Bothered by leaking urine in past 6 months No         3/19/2024   TB Screening   Were you born outside of the [place]? Yes         Today's PHQ-2 Score:       3/19/2024     2:22 PM   PHQ-2 ( 1999 Pf[employer])   Q1: Little interest or pleasure in doing things 0   Q2: Feeling down, depressed or hopeless 0   PHQ-2 Score 0   Q1: Little interest or pleasure in doing things Not at all   Q2: Feeling down, depressed or hopeless Not at all   PHQ-2 Score 0           3/19/2024   Substance Use   Alcohol more than 3/day or more than 7/wk Not Applicable   Do you have a current opioid prescription? No   How severe/bad is pain from 1 to 10? 0/10 (No Pain)   Do you use any other substances recreationally? No     Social History     Tobacco Use    Smoking status: Never    Smokeless tobacco: Never   Vaping Use    Vaping Use: Never used   Substance Use Topics    Alcohol use: No     Drug use: No     ASCVD Risk   The 10-year ASCVD risk score (Nati MENDOZA, et al., 2019) is: 28.6%    Values used to calculate the score:      Age: 79 years      Sex: Male      Is Non- : Yes      Diabetic: No      Tobacco smoker: No      Systolic Blood Pressure: 144 mmHg      Is BP treated: Yes      HDL Cholesterol: 40 mg/dL      Total Cholesterol: 163 mg/dL      Reviewed and updated as needed this visit by Provider     Meds       Sexual Activity          BP Readings from Last 3 Encounters:   03/19/24 (!) 144/84   04/13/23 (!) 126/90   01/27/23 136/84    Wt Readings from Last 3 Encounters:   03/19/24 112.5 kg (248 lb)   04/13/23 112.5 kg (248 lb)   01/27/23 113.4 kg (250 lb)                  Patient Active Problem List   Diagnosis    Benign essential hypertension    CKD (chronic kidney disease) stage 3, GFR 30-59 ml/min (H)    Hyperlipidemia LDL goal <130    Obesity (BMI 35.0-39.9) with comorbidity (H)    Positive for macroalbuminuria     Past Surgical History:   Procedure Laterality Date    COLONOSCOPY N/A 11/14/2018    Procedure: COMBINED COLONOSCOPY, SINGLE OR MULTIPLE BIOPSY/POLYPECTOMY BY BIOPSY;  Surgeon: Azalea Byrne MD;  Location:  GI       Social History     Tobacco Use    Smoking status: Never    Smokeless tobacco: Never   Substance Use Topics    Alcohol use: No     Family History   Problem Relation Age of Onset    Unknown/Adopted Mother     Unknown/Adopted Father     Diabetes No family hx of     Coronary Artery Disease No family hx of     Hypertension No family hx of     Hyperlipidemia No family hx of     Cerebrovascular Disease No family hx of     Breast Cancer No family hx of     Colon Cancer No family hx of     Prostate Cancer No family hx of     Other Cancer No family hx of     Depression No family hx of     Anxiety Disorder No family hx of     Mental Illness No family hx of     Substance Abuse No family hx of     Anesthesia Reaction No family hx of     Asthma  No family hx of     Osteoporosis No family hx of     Genetic Disorder No family hx of     Thyroid Disease No family hx of     Obesity No family hx of          Current Outpatient Medications   Medication Sig Dispense Refill    amLODIPine (NORVASC) 10 MG tablet Take 1 tablet (10 mg) by mouth daily 90 tablet 3    aspirin 81 MG tablet Take 81 mg by mouth daily      carvedilol (COREG) 25 MG tablet Take 1 tablet (25 mg) by mouth 2 times daily (with meals) 180 tablet 3    lisinopril (ZESTRIL) 40 MG tablet Take 1 tablet (40 mg) by mouth daily 90 tablet 3    vitamin D3 (CHOLECALCIFEROL) 50 mcg (2000 units) tablet Take 1 tablet (50 mcg) by mouth daily 180 tablet 1     Current providers sharing in care for this patient include:  Patient Care Team:  Linda Gonzalez APRN CNP as PCP - General (Nurse Practitioner - Family)  Ema Carrillo RN as Lead Care Coordinator (Primary Care - CC)  Linda Gonzalez APRN CNP as Assigned PCP    The following health maintenance items are reviewed in Epic and correct as of today:  Health Maintenance   Topic Date Due    DTAP/TDAP/TD IMMUNIZATION (1 - Tdap) Never done    ZOSTER IMMUNIZATION (1 of 2) Never done    RSV VACCINE (Pregnancy & 60+) (1 - 1-dose 60+ series) Never done    INFLUENZA VACCINE (1) 09/01/2023    COVID-19 Vaccine (3 - 2023-24 season) 09/01/2023    MEDICARE ANNUAL WELLNESS VISIT  03/19/2025    BMP  03/19/2025    LIPID  03/19/2025    MICROALBUMIN  03/19/2025    ANNUAL REVIEW OF HM ORDERS  03/19/2025    FALL RISK ASSESSMENT  03/19/2025    HEMOGLOBIN  03/19/2025    GLUCOSE  03/19/2027    ADVANCE CARE PLANNING  01/27/2028    HEPATITIS C SCREENING  Completed    PHQ-2 (once per calendar year)  Completed    Pneumococcal Vaccine: 65+ Years  Completed    URINALYSIS  Completed    IPV IMMUNIZATION  Aged Out    HPV IMMUNIZATION  Aged Out    MENINGITIS IMMUNIZATION  Aged Out    RSV MONOCLONAL ANTIBODY  Aged Out    COLORECTAL CANCER SCREENING  Discontinued         Review of  "Systems  Constitutional, HEENT, cardiovascular, pulmonary, gi and gu systems are negative, except as otherwise noted.     Objective    Exam  BP (!) 144/84   Pulse 87   Temp 97.2  F (36.2  C) (Tympanic)   Resp 18   Ht 1.727 m (5' 8\")   Wt 112.5 kg (248 lb)   SpO2 97%   BMI 37.71 kg/m     Estimated body mass index is 37.71 kg/m  as calculated from the following:    Height as of this encounter: 1.727 m (5' 8\").    Weight as of this encounter: 112.5 kg (248 lb).    Physical Exam    GENERAL: alert and no distress  EYES: Eyes grossly normal to inspection  HENT: ear canals and TM's normal, nose and mouth without ulcers or lesions  NECK: no adenopathy, no asymmetry, masses, or scars  RESP: lungs clear to auscultation - no rales, rhonchi or wheezes  CV: regular rate and rhythm, normal S1 S2  ABDOMEN: soft, nontender, bowel sounds normal  MS: no gross musculoskeletal defects noted, no edema  SKIN: no suspicious lesions or rashes  NEURO: Normal strength and tone, mentation intact and speech normal  PSYCH: mentation appears normal, affect normal/bright        3/19/2024   Mini Cog   Clock Draw Score 2 Normal   3 Item Recall 2 objects recalled   Mini Cog Total Score 4         Signed Electronically by: MIHAELA Clark CNP    "

## 2024-03-19 NOTE — PATIENT INSTRUCTIONS
Goal BP:  less than 140/90    Preventive Care Advice   This is general advice given by our system to help you stay healthy. However, your care team may have specific advice just for you. Please talk to your care team about your preventive care needs.  Nutrition  Eat 5 or more servings of fruits and vegetables each day.  Try wheat bread, brown rice and whole grain pasta (instead of white bread, rice, and pasta).  Get enough calcium and vitamin D. Check the label on foods and aim for 100% of the RDA (recommended daily allowance).  Lifestyle  Exercise at least 150 minutes each week   (30 minutes a day, 5 days a week).  Do muscle strengthening activities 2 days a week. These help control your weight and prevent disease.  No smoking.  Wear sunscreen to prevent skin cancer.  Have a dental exam and cleaning every 6 months.  Yearly exams  See your health care team every year to talk about:  Any changes in your health.  Any medicines your care team has prescribed.  Preventive care, family planning, and ways to prevent chronic diseases.  Shots (vaccines)   HPV shots (up to age 26), if you've never had them before.  Hepatitis B shots (up to age 59), if you've never had them before.  COVID-19 shot: Get this shot when it's due.  Flu shot: Get a flu shot every year.  Tetanus shot: Get a tetanus shot every 10 years.  Pneumococcal, hepatitis A, and RSV shots: Ask your care team if you need these based on your risk.  Shingles shot (for age 50 and up).  General health tests  Diabetes screening:  Starting at age 35, Get screened for diabetes at least every 3 years.  If you are younger than age 35, ask your care team if you should be screened for diabetes.  Cholesterol test: At age 39, start having a cholesterol test every 5 years, or more often if advised.  Bone density scan (DEXA): At age 50, ask your care team if you should have this scan for osteoporosis (brittle bones).  Hepatitis C: Get tested at least once in your life.  STIs  (sexually transmitted infections)  Before age 24: Ask your care team if you should be screened for STIs.  After age 24: Get screened for STIs if you're at risk. You are at risk for STIs (including HIV) if:  You are sexually active with more than one person.  You don't use condoms every time.  You or a partner was diagnosed with a sexually transmitted infection.  If you are at risk for HIV, ask about PrEP medicine to prevent HIV.  Get tested for HIV at least once in your life, whether you are at risk for HIV or not.  Cancer screening tests  Cervical cancer screening: If you have a cervix, begin getting regular cervical cancer screening tests at age 21. Most people who have regular screenings with normal results can stop after age 65. Talk about this with your provider.  Breast cancer scan (mammogram): If you've ever had breasts, begin having regular mammograms starting at age 40. This is a scan to check for breast cancer.  Colon cancer screening: It is important to start screening for colon cancer at age 45.  Have a colonoscopy test every 10 years (or more often if you're at risk) Or, ask your provider about stool tests like a FIT test every year or Cologuard test every 3 years.  To learn more about your testing options, visit: https://www.[employer]/079225.pdf.  For help making a decision, visit: https://bit.ly/pf51825.  Prostate cancer screening test: If you have a prostate and are age 55 to 69, ask your provider if you would benefit from a yearly prostate cancer screening test.  Lung cancer screening: If you are a current or former smoker age 50 to 80, ask your care team if ongoing lung cancer screenings are right for you.  For informational purposes only. Not to replace the advice of your health care provider. Copyright   2023 [employer]. All rights reserved. Clinically reviewed by the [facility] Transitions Program. [employer] 346151 - REV 01/24.

## 2024-03-20 DIAGNOSIS — N18.32 STAGE 3B CHRONIC KIDNEY DISEASE (H): Primary | ICD-10-CM

## 2024-03-20 DIAGNOSIS — E53.8 VITAMIN B12 DEFICIENCY (NON ANEMIC): ICD-10-CM

## 2024-03-20 LAB
ANION GAP SERPL CALCULATED.3IONS-SCNC: 10 MMOL/L (ref 7–15)
BUN SERPL-MCNC: 16.4 MG/DL (ref 8–23)
CALCIUM SERPL-MCNC: 9.9 MG/DL (ref 8.8–10.2)
CHLORIDE SERPL-SCNC: 103 MMOL/L (ref 98–107)
CHOLEST SERPL-MCNC: 163 MG/DL
CREAT SERPL-MCNC: 1.5 MG/DL (ref 0.67–1.17)
CREAT UR-MCNC: 81.2 MG/DL
DEPRECATED HCO3 PLAS-SCNC: 25 MMOL/L (ref 22–29)
EGFRCR SERPLBLD CKD-EPI 2021: 48 ML/MIN/1.73M2
FASTING STATUS PATIENT QL REPORTED: YES
GLUCOSE SERPL-MCNC: 81 MG/DL (ref 70–99)
HDLC SERPL-MCNC: 40 MG/DL
LDLC SERPL CALC-MCNC: 108 MG/DL
MICROALBUMIN UR-MCNC: 59.6 MG/L
MICROALBUMIN/CREAT UR: 73.4 MG/G CR (ref 0–17)
NONHDLC SERPL-MCNC: 123 MG/DL
POTASSIUM SERPL-SCNC: 5 MMOL/L (ref 3.4–5.3)
SODIUM SERPL-SCNC: 138 MMOL/L (ref 135–145)
TRIGL SERPL-MCNC: 74 MG/DL

## 2024-03-20 NOTE — RESULT ENCOUNTER NOTE
Dear Bharat,     -Hemoglobin is decreased, but stable.    -White blood cell is slightly decreased and platelet counts are normal.    I recommend following up for a lab only visit in 3-6 months to have labs completed again.        Thank you,     Linda Gonzalez, MIHAELA, [facility]

## 2024-03-21 NOTE — RESULT ENCOUNTER NOTE
Dear Bharat,     -Microalbumin (urine protein) level is elevated. This is suggestive of early damage to your kidneys from high blood pressure.  ADVISE: avoiding anti-inflamatory agents such as ibuprofen (Advil, Motrin) or naproxen (Aleve) as much as possible, keeping your blood pressure in a normal range, and continuing your medication (lisinopril) that helps protect your kidneys.  Also, this should be rechecked in 1 year.       Thank you,     Linda Gonzalez, APRN, CNP  [facility]

## 2024-04-30 ENCOUNTER — PATIENT OUTREACH (OUTPATIENT)
Dept: GERIATRIC MEDICINE | Facility: CLINIC | Age: 79
End: 2024-04-30
Payer: COMMERCIAL

## 2024-04-30 NOTE — PROGRESS NOTES
[facility] Care Coordination Contact    Son called requesting home visit for member. Followed up with  member and adult son Estella  to schedule annual HRA home visit. HRA has been scheduled for 05/07/2024. Adult son able to speak English.   Called [employer] to request  for the home visit. Request is in process.  [facility] do not have a [facility] .    Ema Carrillo RN BSN PHN      [facility]  Care Coordinator  Phone:   [phone]  Fax:       [phone]

## 2024-05-07 ENCOUNTER — PATIENT OUTREACH (OUTPATIENT)
Dept: GERIATRIC MEDICINE | Facility: CLINIC | Age: 79
End: 2024-05-07

## 2024-05-07 ASSESSMENT — PATIENT HEALTH QUESTIONNAIRE - PHQ9: SUM OF ALL RESPONSES TO PHQ QUESTIONS 1-9: 4

## 2024-05-07 ASSESSMENT — ACTIVITIES OF DAILY LIVING (ADL): DEPENDENT_IADLS:: CLEANING;COOKING;LAUNDRY;SHOPPING;MEAL PREPARATION;TRANSPORTATION;MEDICATION MANAGEMENT

## 2024-05-07 NOTE — PROGRESS NOTES
[facility] Care Coordination Contact    [facility] Home Visit Assessment     Home visit for Health Risk Assessment with Bharat Bain completed on May 7, 2024    Type of residence:: Apartment  Current living arrangement:: I live in a private home with spouse     Assessment completed with:: Patient, Children    Current Care Plan  Member currently receiving the following home care services:   N/A  Member currently receiving the following community resources: No formal service open at this time.      Medication Review  Medication reconciliation completed in Epic: Yes  Medication set-up & administration: Family/informal caregiver sets up daily.  Family caregiver administers medications.  Medication Risk Assessment Medication (1 or more, place referral to MTM): Taking 1 or more high-risk medications for adults >65 years  MTM Referral Placed: No: reviewed benefit only    Mental/Behavioral Health   Depression Screening:      PHQ-9 Total Score: 4    Mental health DX:: Yes (PTSD)        Falls Assessment:   Fallen 2 or more times in the past year?: No   Any fall with injury in the past year?: No    ADL/IADL Dependencies:   Dependent ADLs:: Bathing, intermittent/occasional assistance with toileting.  Dependent IADLs:: Cleaning, Cooking, Laundry, Shopping, Meal Preparation, Transportation, Medication Management    Health Plan sponsored benefits: [employer] MSC+: Shared information regarding preventative health screening and health plan supplemental benefits/incentives. Reviewed medication disposal form.    PCA Assessment completed at visit: Yes, member to start other service, ICLS.        Elderly Waiver Eligibility: Yes-will open to EW    Care Plan & Recommendations: Plan is to open member to EW and start ICLS.    See CC for detailed assessment information.    Follow-Up Plan: Member informed of future contact, plan to f/u with member with a 6 month telephone assessment.  Contact information shared with member and  family, encouraged member to call with any questions or concerns at any time.    [facility] care continuum providers: Please see Snapshot and Care Management Flowsheets for Specific details of care plan.    This CC note routed to PCP, Linda Gonzalez RN BSN PHN      [facility]  Care Coordinator  Phone:   [phone]  Fax:       [phone]

## 2024-05-22 ENCOUNTER — LAB (OUTPATIENT)
Dept: LAB | Facility: CLINIC | Age: 79
End: 2024-05-22
Payer: COMMERCIAL

## 2024-05-22 DIAGNOSIS — N18.32 STAGE 3B CHRONIC KIDNEY DISEASE (H): ICD-10-CM

## 2024-05-22 DIAGNOSIS — E53.8 VITAMIN B12 DEFICIENCY (NON ANEMIC): ICD-10-CM

## 2024-05-22 LAB
ANION GAP SERPL CALCULATED.3IONS-SCNC: 12 MMOL/L (ref 7–15)
BUN SERPL-MCNC: 20.3 MG/DL (ref 8–23)
CALCIUM SERPL-MCNC: 10 MG/DL (ref 8.8–10.2)
CHLORIDE SERPL-SCNC: 104 MMOL/L (ref 98–107)
CREAT SERPL-MCNC: 1.66 MG/DL (ref 0.67–1.17)
DEPRECATED HCO3 PLAS-SCNC: 24 MMOL/L (ref 22–29)
EGFRCR SERPLBLD CKD-EPI 2021: 42 ML/MIN/1.73M2
ERYTHROCYTE [DISTWIDTH] IN BLOOD BY AUTOMATED COUNT: 17 % (ref 10–15)
GLUCOSE SERPL-MCNC: 90 MG/DL (ref 70–99)
HCT VFR BLD AUTO: 35.7 % (ref 40–53)
HGB BLD-MCNC: 11.7 G/DL (ref 13.3–17.7)
MCH RBC QN AUTO: 25.2 PG (ref 26.5–33)
MCHC RBC AUTO-ENTMCNC: 32.8 G/DL (ref 31.5–36.5)
MCV RBC AUTO: 77 FL (ref 78–100)
PLATELET # BLD AUTO: 262 10E3/UL (ref 150–450)
POTASSIUM SERPL-SCNC: 4.5 MMOL/L (ref 3.4–5.3)
RBC # BLD AUTO: 4.65 10E6/UL (ref 4.4–5.9)
SODIUM SERPL-SCNC: 140 MMOL/L (ref 135–145)
WBC # BLD AUTO: 4.1 10E3/UL (ref 4–11)

## 2024-05-22 PROCEDURE — 36415 COLL VENOUS BLD VENIPUNCTURE: CPT

## 2024-05-22 PROCEDURE — 85027 COMPLETE CBC AUTOMATED: CPT

## 2024-05-22 PROCEDURE — 80048 BASIC METABOLIC PNL TOTAL CA: CPT

## 2024-06-03 DIAGNOSIS — N18.32 STAGE 3B CHRONIC KIDNEY DISEASE (H): Primary | ICD-10-CM

## 2024-06-03 NOTE — RESULT ENCOUNTER NOTE
Dear Bharat,     -Hemoglobin is decreased indicating anemia, this continues to be stable.    -White blood cell and platelet counts are normal.  -Kidney function (GFR) is decreased.  ADVISE: rechecking this in 3-6 months  -Sodium is normal.  -Potassium is normal.  -Calcium is normal.  -Glucose (diabetic screening test) is normal.      Thank you,     Linda Gonzalez, APRN, CNP  [facility]

## 2024-06-07 ENCOUNTER — PATIENT OUTREACH (OUTPATIENT)
Dept: GERIATRIC MEDICINE | Facility: CLINIC | Age: 79
End: 2024-06-07
Payer: COMMERCIAL

## 2024-06-07 NOTE — PROGRESS NOTES
[facility] Care Coordination Contact    Received after visit chart from care coordinator.  Completed following tasks: Mailed copy of care plan/support plan to member, Mailed [place] Cho[place] signature sheet pages 3-4, and Mailed Safe Medication Disposal    and Provider Signature - No POC Shared:  Member indicates that they do not want their POC shared with any EW providers.    [facility]:  Emailed required PCA documents to [facility].  Mailed copy to member.  Faxed MD Communication to PCP.   -Member declined PCA services, they are going to do ICLS instead-    **Leaving support plan open per CC request, member just became open to EW**    Nori Vazquez  Care Management Specialist  [facility]  [phone]

## 2024-06-07 NOTE — LETTER
June 7, 2024    LORETO PYLE  [address]  [place] [address]        Dear Loreto:    At [facility], we re dedicated to improving your health and wellness. Enclosed is the Care Plan developed with you on 5/7/2024. Please review the Care Plan carefully.    As a reminder, during your visit we talked about:  Ways to manage your physical and mental health  Using health care to maintain and improve your health   Your preventive care needs     Remember to contact your care coordinator if you:  Are hospitalized, or plan to be hospitalized   Have a fall    Have a change in your physical or mental health  Need help finding support or services    If you have questions, or don t agree with your Care Plan, call me at [phone]. You can also call me if your needs change. TTY users, call the [place] [employer] at (543) or 1-760.590.1075 (ekqwnx-oc-ufqmqq relay service).    Sincerely,        RICHA Christopher, RN, PHN    E-mail: [email or web address]  Phone: [phone]      [employer]    [id number] accepted    [id number] (07/2022)                       Patient counseled on dose change. No questions or concerns at this time.

## 2024-08-28 ENCOUNTER — PATIENT OUTREACH (OUTPATIENT)
Dept: GERIATRIC MEDICINE | Facility: CLINIC | Age: 79
End: 2024-08-28
Payer: COMMERCIAL

## 2024-08-28 NOTE — PROGRESS NOTES
[facility] Care Coordination Contact    08/28/2024:  RANDELL cedeno, day 62 as of August/2024.  Completed UTF and requested CMS to fax transfer paperwork and UTF to [facility].  Ema Carrillo RN BSN PHN      [facility]  Care Coordinator  Phone:   [phone]  Fax:       [phone]

## 2024-09-27 ENCOUNTER — PATIENT OUTREACH (OUTPATIENT)
Dept: GERIATRIC MEDICINE | Facility: CLINIC | Age: 79
End: 2024-09-27
Payer: COMMERCIAL

## 2024-10-03 NOTE — PROGRESS NOTES
[facility] Care Coordination Contact    No longer active with [facility] case management effective 06/30/2024.  Reason for community disenrollment: RANDELL Carrillo RN BSN PHN      [facility]  Care Coordinator  Phone:   [phone]  Fax:       [phone]

## 2025-04-26 ENCOUNTER — HEALTH MAINTENANCE LETTER (OUTPATIENT)
Age: 80
End: 2025-04-26

## (undated) RX ORDER — FENTANYL CITRATE 50 UG/ML
INJECTION, SOLUTION INTRAMUSCULAR; INTRAVENOUS
Status: DISPENSED
Start: 2018-11-14